# Patient Record
Sex: MALE | Race: WHITE | NOT HISPANIC OR LATINO | Employment: OTHER | URBAN - METROPOLITAN AREA
[De-identification: names, ages, dates, MRNs, and addresses within clinical notes are randomized per-mention and may not be internally consistent; named-entity substitution may affect disease eponyms.]

---

## 2023-01-04 LAB
NONINV COLON CA DNA+OCC BLD SCRN STL QL: NEGATIVE
NONINV COLON CA DNA+OCC BLD SCRN STL-IMP: NORMAL

## 2023-04-18 ENCOUNTER — TELEPHONE (OUTPATIENT)
Dept: PRIMARY CARE | Facility: CLINIC | Age: 68
End: 2023-04-18
Payer: MEDICARE

## 2023-04-18 DIAGNOSIS — G62.9 NEUROPATHY: Primary | ICD-10-CM

## 2023-04-18 RX ORDER — GABAPENTIN 300 MG/1
300 CAPSULE ORAL NIGHTLY
Qty: 30 CAPSULE | Refills: 0 | Status: SHIPPED | OUTPATIENT
Start: 2023-04-18 | End: 2023-04-28

## 2023-04-18 RX ORDER — GABAPENTIN 300 MG/1
300 CAPSULE ORAL NIGHTLY
COMMUNITY
Start: 2023-01-29 | End: 2023-04-18 | Stop reason: SDUPTHER

## 2023-04-18 NOTE — TELEPHONE ENCOUNTER
Can you refill his Gabapentin 300mg 1 capsule at bedtime-he is scheduled to see you April 28th [facility]

## 2023-04-21 PROBLEM — J93.9 PNEUMOTHORAX: Status: ACTIVE | Noted: 2023-04-21

## 2023-04-21 PROBLEM — S22.49XA RIB FRACTURES: Status: ACTIVE | Noted: 2023-04-21

## 2023-04-21 PROBLEM — B07.0 PLANTAR WART OF RIGHT FOOT: Status: ACTIVE | Noted: 2023-04-21

## 2023-04-21 PROBLEM — M54.9 BACK PAIN: Status: ACTIVE | Noted: 2023-04-21

## 2023-04-21 PROBLEM — E78.5 DYSLIPIDEMIA: Status: ACTIVE | Noted: 2023-04-21

## 2023-04-21 PROBLEM — U07.1 COVID-19: Status: RESOLVED | Noted: 2023-04-21 | Resolved: 2023-04-21

## 2023-04-21 PROBLEM — N28.9 RENAL INSUFFICIENCY: Status: ACTIVE | Noted: 2023-04-21

## 2023-04-21 PROBLEM — I10 BENIGN ESSENTIAL HYPERTENSION: Status: ACTIVE | Noted: 2023-04-21

## 2023-04-21 PROBLEM — R07.9 CHEST PAIN: Status: ACTIVE | Noted: 2023-04-21

## 2023-04-21 PROBLEM — R73.01 ELEVATED FASTING GLUCOSE: Status: ACTIVE | Noted: 2023-04-21

## 2023-04-21 PROBLEM — M79.643 HAND PAIN: Status: ACTIVE | Noted: 2023-04-21

## 2023-04-21 PROBLEM — I71.9 AORTIC ANEURYSM (CMS-HCC): Status: ACTIVE | Noted: 2023-04-21

## 2023-04-21 PROBLEM — R20.2 PARESTHESIA: Status: ACTIVE | Noted: 2023-04-21

## 2023-04-21 PROBLEM — M25.569 JOINT PAIN, KNEE: Status: ACTIVE | Noted: 2023-04-21

## 2023-04-21 RX ORDER — LISINOPRIL 20 MG/1
20 TABLET ORAL DAILY
COMMUNITY
End: 2023-04-28 | Stop reason: SDUPTHER

## 2023-04-28 ENCOUNTER — LAB (OUTPATIENT)
Dept: LAB | Facility: LAB | Age: 68
End: 2023-04-28
Payer: MEDICARE

## 2023-04-28 ENCOUNTER — OFFICE VISIT (OUTPATIENT)
Dept: PRIMARY CARE | Facility: CLINIC | Age: 68
End: 2023-04-28
Payer: MEDICARE

## 2023-04-28 VITALS
OXYGEN SATURATION: 95 % | DIASTOLIC BLOOD PRESSURE: 102 MMHG | HEART RATE: 77 BPM | HEIGHT: 67 IN | RESPIRATION RATE: 18 BRPM | SYSTOLIC BLOOD PRESSURE: 158 MMHG | WEIGHT: 181 LBS | BODY MASS INDEX: 28.41 KG/M2

## 2023-04-28 DIAGNOSIS — E55.9 VITAMIN D DEFICIENCY: ICD-10-CM

## 2023-04-28 DIAGNOSIS — E78.5 DYSLIPIDEMIA: ICD-10-CM

## 2023-04-28 DIAGNOSIS — R73.01 ELEVATED FASTING GLUCOSE: ICD-10-CM

## 2023-04-28 DIAGNOSIS — Z12.5 SCREENING PSA (PROSTATE SPECIFIC ANTIGEN): ICD-10-CM

## 2023-04-28 DIAGNOSIS — E53.8 VITAMIN B 12 DEFICIENCY: ICD-10-CM

## 2023-04-28 DIAGNOSIS — R20.0 NUMBNESS AND TINGLING IN BOTH HANDS: Primary | ICD-10-CM

## 2023-04-28 DIAGNOSIS — Z13.29 SCREENING FOR HYPOTHYROIDISM: ICD-10-CM

## 2023-04-28 DIAGNOSIS — R20.2 NUMBNESS AND TINGLING IN BOTH HANDS: Primary | ICD-10-CM

## 2023-04-28 DIAGNOSIS — I10 PRIMARY HYPERTENSION: ICD-10-CM

## 2023-04-28 PROBLEM — I71.9 AORTIC ANEURYSM (CMS-HCC): Status: RESOLVED | Noted: 2023-04-21 | Resolved: 2023-04-28

## 2023-04-28 LAB
ALANINE AMINOTRANSFERASE (SGPT) (U/L) IN SER/PLAS: 24 U/L (ref 10–52)
ALBUMIN (G/DL) IN SER/PLAS: 4.3 G/DL (ref 3.4–5)
ALKALINE PHOSPHATASE (U/L) IN SER/PLAS: 61 U/L (ref 33–136)
ANION GAP IN SER/PLAS: 13 MMOL/L (ref 10–20)
ASPARTATE AMINOTRANSFERASE (SGOT) (U/L) IN SER/PLAS: 18 U/L (ref 9–39)
BILIRUBIN TOTAL (MG/DL) IN SER/PLAS: 0.5 MG/DL (ref 0–1.2)
CALCIDIOL (25 OH VITAMIN D3) (NG/ML) IN SER/PLAS: 18 NG/ML
CALCIUM (MG/DL) IN SER/PLAS: 9.5 MG/DL (ref 8.6–10.3)
CARBON DIOXIDE, TOTAL (MMOL/L) IN SER/PLAS: 24 MMOL/L (ref 21–32)
CHLORIDE (MMOL/L) IN SER/PLAS: 106 MMOL/L (ref 98–107)
CHOLESTEROL (MG/DL) IN SER/PLAS: 198 MG/DL (ref 0–199)
CHOLESTEROL IN HDL (MG/DL) IN SER/PLAS: 50 MG/DL
CHOLESTEROL/HDL RATIO: 4
COBALAMIN (VITAMIN B12) (PG/ML) IN SER/PLAS: 276 PG/ML (ref 211–911)
CREATININE (MG/DL) IN SER/PLAS: 0.7 MG/DL (ref 0.5–1.3)
ERYTHROCYTE DISTRIBUTION WIDTH (RATIO) BY AUTOMATED COUNT: 13.8 % (ref 11.5–14.5)
ERYTHROCYTE MEAN CORPUSCULAR HEMOGLOBIN CONCENTRATION (G/DL) BY AUTOMATED: 32.2 G/DL (ref 32–36)
ERYTHROCYTE MEAN CORPUSCULAR VOLUME (FL) BY AUTOMATED COUNT: 87 FL (ref 80–100)
ERYTHROCYTES (10*6/UL) IN BLOOD BY AUTOMATED COUNT: 5.48 X10E12/L (ref 4.5–5.9)
ESTIMATED AVERAGE GLUCOSE FOR HBA1C: 131 MG/DL
GFR MALE: >90 ML/MIN/1.73M2
GLUCOSE (MG/DL) IN SER/PLAS: 98 MG/DL (ref 74–99)
HEMATOCRIT (%) IN BLOOD BY AUTOMATED COUNT: 47.8 % (ref 41–52)
HEMOGLOBIN (G/DL) IN BLOOD: 15.4 G/DL (ref 13.5–17.5)
HEMOGLOBIN A1C/HEMOGLOBIN TOTAL IN BLOOD: 6.2 %
LDL: 127 MG/DL (ref 0–99)
LEUKOCYTES (10*3/UL) IN BLOOD BY AUTOMATED COUNT: 5.7 X10E9/L (ref 4.4–11.3)
PLATELETS (10*3/UL) IN BLOOD AUTOMATED COUNT: 377 X10E9/L (ref 150–450)
POTASSIUM (MMOL/L) IN SER/PLAS: 4.7 MMOL/L (ref 3.5–5.3)
PROTEIN TOTAL: 6.6 G/DL (ref 6.4–8.2)
SODIUM (MMOL/L) IN SER/PLAS: 138 MMOL/L (ref 136–145)
THYROTROPIN (MIU/L) IN SER/PLAS BY DETECTION LIMIT <= 0.05 MIU/L: 2.07 MIU/L (ref 0.44–3.98)
TRIGLYCERIDE (MG/DL) IN SER/PLAS: 107 MG/DL (ref 0–149)
UREA NITROGEN (MG/DL) IN SER/PLAS: 19 MG/DL (ref 6–23)
VLDL: 21 MG/DL (ref 0–40)

## 2023-04-28 PROCEDURE — G0103 PSA SCREENING: HCPCS

## 2023-04-28 PROCEDURE — 85027 COMPLETE CBC AUTOMATED: CPT

## 2023-04-28 PROCEDURE — 1160F RVW MEDS BY RX/DR IN RCRD: CPT

## 2023-04-28 PROCEDURE — 3077F SYST BP >= 140 MM HG: CPT

## 2023-04-28 PROCEDURE — 1036F TOBACCO NON-USER: CPT

## 2023-04-28 PROCEDURE — 1159F MED LIST DOCD IN RCRD: CPT

## 2023-04-28 PROCEDURE — 83036 HEMOGLOBIN GLYCOSYLATED A1C: CPT

## 2023-04-28 PROCEDURE — 84443 ASSAY THYROID STIM HORMONE: CPT

## 2023-04-28 PROCEDURE — 82607 VITAMIN B-12: CPT

## 2023-04-28 PROCEDURE — 84154 ASSAY OF PSA FREE: CPT

## 2023-04-28 PROCEDURE — 80061 LIPID PANEL: CPT

## 2023-04-28 PROCEDURE — 99214 OFFICE O/P EST MOD 30 MIN: CPT

## 2023-04-28 PROCEDURE — 3080F DIAST BP >= 90 MM HG: CPT

## 2023-04-28 PROCEDURE — 82306 VITAMIN D 25 HYDROXY: CPT

## 2023-04-28 PROCEDURE — 36415 COLL VENOUS BLD VENIPUNCTURE: CPT

## 2023-04-28 PROCEDURE — 80053 COMPREHEN METABOLIC PANEL: CPT

## 2023-04-28 RX ORDER — GABAPENTIN 600 MG/1
600 TABLET ORAL NIGHTLY
Qty: 90 TABLET | Refills: 1 | Status: SHIPPED | OUTPATIENT
Start: 2023-04-28 | End: 2023-08-22 | Stop reason: SDUPTHER

## 2023-04-28 RX ORDER — LISINOPRIL 20 MG/1
20 TABLET ORAL 2 TIMES DAILY
Qty: 180 TABLET | Refills: 1 | Status: SHIPPED | OUTPATIENT
Start: 2023-04-28 | End: 2023-12-04 | Stop reason: SDUPTHER

## 2023-04-28 RX ORDER — MULTIVITAMIN
1 TABLET ORAL DAILY
COMMUNITY

## 2023-04-28 ASSESSMENT — ENCOUNTER SYMPTOMS
CONSTITUTIONAL NEGATIVE: 1
CARDIOVASCULAR NEGATIVE: 1
RESPIRATORY NEGATIVE: 1
ALLERGIC/IMMUNOLOGIC NEGATIVE: 1
PSYCHIATRIC NEGATIVE: 1
ENDOCRINE NEGATIVE: 1
GASTROINTESTINAL NEGATIVE: 1
HEMATOLOGIC/LYMPHATIC NEGATIVE: 1
EYES NEGATIVE: 1
MUSCULOSKELETAL NEGATIVE: 1
NEUROLOGICAL NEGATIVE: 1

## 2023-04-28 ASSESSMENT — PATIENT HEALTH QUESTIONNAIRE - PHQ9
1. LITTLE INTEREST OR PLEASURE IN DOING THINGS: NOT AT ALL
SUM OF ALL RESPONSES TO PHQ9 QUESTIONS 1 AND 2: 0
2. FEELING DOWN, DEPRESSED OR HOPELESS: NOT AT ALL

## 2023-04-28 NOTE — PROGRESS NOTES
Patient here today bacause his hands are tingly all the time and when he lays down at night to go to sleep they burn very bad.    2 years ago he fell on side of trailer( he is a ) and since then he has had the tingling and the burning at night.     Takes bp med at  night - should he be taking in morning ???

## 2023-04-28 NOTE — PATIENT INSTRUCTIONS
November 2023 appointment.    Complete bloodwork today    Gabapentin 600mg nightly    Lisinopril 20mg twice a day    EMG study ordered. Please call to schedule

## 2023-04-28 NOTE — PROGRESS NOTES
"Subjective   Patient ID: Alen Cobian is a 67 y.o. male who presents for Follow-up.    HPI a 67-year-old male with past medical history of hypertension, vitamin deficiency, numbness and tingling of the upper extremities, prediabetes arrives to the clinic with chief complaint of 4-month follow-up.  He is here to establish with a new primary care provider as his previous one is now retired.  At his last appointment, he was given gabapentin 300 mg oral tablet nightly for his numbness and tingling of his hands.  He reports that the gabapentin has worked however was still have episodes of numbness and tingling.He states that his son is a physical therapist who recommends a nerve conduction study.  He is wondering if he can get that ordered.  Other than this, the patient denies any chest pain, shortness of breath, diarrhea, fevers, chills, head pain, COVID-like symptoms.    Review of Systems   Constitutional: Negative.    HENT: Negative.     Eyes: Negative.    Respiratory: Negative.     Cardiovascular: Negative.    Gastrointestinal: Negative.    Endocrine: Negative.    Genitourinary: Negative.    Musculoskeletal: Negative.    Skin: Negative.    Allergic/Immunologic: Negative.    Neurological: Negative.    Hematological: Negative.    Psychiatric/Behavioral: Negative.     All other systems reviewed and are negative.      Objective   BP (!) 158/102 (BP Location: Right arm, BP Cuff Size: Large adult)   Pulse 77   Resp 18   Ht 1.702 m (5' 7\")   Wt 82.1 kg (181 lb)   SpO2 95%   BMI 28.35 kg/m²     Physical Exam  Vitals and nursing note reviewed.   Constitutional:       Appearance: Normal appearance.   HENT:      Head: Normocephalic and atraumatic.      Right Ear: Tympanic membrane normal.      Left Ear: Tympanic membrane normal.      Nose: Nose normal.      Mouth/Throat:      Mouth: Mucous membranes are moist.      Pharynx: Oropharynx is clear.   Eyes:      Extraocular Movements: Extraocular movements intact.      " Conjunctiva/sclera: Conjunctivae normal.      Pupils: Pupils are equal, round, and reactive to light.   Cardiovascular:      Rate and Rhythm: Normal rate and regular rhythm.   Pulmonary:      Effort: Pulmonary effort is normal.      Breath sounds: Normal breath sounds.   Abdominal:      General: Bowel sounds are normal.      Palpations: Abdomen is soft.   Musculoskeletal:         General: Normal range of motion.      Cervical back: Normal range of motion and neck supple.   Skin:     General: Skin is warm.      Capillary Refill: Capillary refill takes less than 2 seconds.   Neurological:      General: No focal deficit present.      Mental Status: He is alert and oriented to person, place, and time. Mental status is at baseline.   Psychiatric:         Mood and Affect: Mood normal.         Behavior: Behavior normal.         Thought Content: Thought content normal.         Judgment: Judgment normal.         Assessment/Plan   Problem List Items Addressed This Visit          Other    Dyslipidemia    Relevant Orders    Lipid Panel    Elevated fasting glucose    Relevant Orders    Comprehensive Metabolic Panel    Hemoglobin A1C     Other Visit Diagnoses       Numbness and tingling in both hands    -  Primary    Relevant Medications    gabapentin (Neurontin) 600 mg tablet    Other Relevant Orders    EMG & nerve conduction    Follow Up In [facility] - PCP    Vitamin D deficiency        Relevant Orders    Vitamin D, Total    Vitamin B 12 deficiency        Relevant Orders    CBC    Vitamin B12    Screening for hypothyroidism        Relevant Orders    TSH with reflex to Free T4 if abnormal    Screening PSA (prostate specific antigen)        Relevant Orders    PSA, Total and Free    Primary hypertension        Relevant Medications    lisinopril 20 mg tablet          It was a pleasure seeing you in the office today.    In regards to your numbness and tingling, I have increased your gabapentin 300 mg oral tablet nightly  to gabapentin 600 mg oral tablet nightly.  I have also ordered an EMG study for you to complete.  Please call the number in your discharge instructions to set this appointment up.    Your blood pressure in the office was also elevated.  Please begin taking lisinopril 20 mg oral tablet twice a day.  This has doubled since her last appointment.    I have ordered lab work for you to complete today.  Your next appointment will be in 6 months.  Please complete the lab work today.  I will call with any abnormal results.    I also advised you to follow low fat diet and exercise for at least 30 minutes daily.    Anticipatory guidance, age appropriate vaccines, screening exams, health promotion and prevention discussed.    This document was generated using the assistance of voice recognition software. If there are any errors of spelling, grammar, syntax, or meaning; please feel free to contact me directly for clarification.

## 2023-05-01 LAB
PROSTATE SPECIFIC AG (NG/ML) IN SER/PLAS: 1.8 NG/ML (ref 0–4)
PROSTATE SPECIFIC AG FREE (NG/ML) IN SER/PLAS: 0.4 NG/ML
PROSTATE SPECIFIC AG FREE/PROSTATE SPECIFIC AG TOTAL IN SER/PLAS: 22 %

## 2023-05-08 ENCOUNTER — TELEPHONE (OUTPATIENT)
Dept: PRIMARY CARE | Facility: CLINIC | Age: 68
End: 2023-05-08
Payer: MEDICARE

## 2023-05-08 DIAGNOSIS — I10 BENIGN ESSENTIAL HYPERTENSION: Primary | ICD-10-CM

## 2023-05-08 NOTE — TELEPHONE ENCOUNTER
Patient was in on 4/28 and increased taking lisinopril 20mg 2 times a day due to BP being high. Patient states his BP is still reading high and wanted to know if he should make an appointment or adjust meds. Thanks!

## 2023-05-09 RX ORDER — CHLORTHALIDONE 25 MG/1
25 TABLET ORAL DAILY
Qty: 90 TABLET | Refills: 0 | Status: SHIPPED | OUTPATIENT
Start: 2023-05-09 | End: 2023-08-22 | Stop reason: SDUPTHER

## 2023-05-09 NOTE — TELEPHONE ENCOUNTER
Please notify the patient to continue to take lisinopril 20mg oral tablet BID and I introduced chlorthalidone 25mg daily. Tell him to take this in the morning. Let him know to call us back in a month to let us know how his BP is doing thanks!    [facility] in [facility].

## 2023-08-21 ENCOUNTER — TELEPHONE (OUTPATIENT)
Dept: PRIMARY CARE | Facility: CLINIC | Age: 68
End: 2023-08-21
Payer: MEDICARE

## 2023-08-22 ENCOUNTER — TELEPHONE (OUTPATIENT)
Dept: PRIMARY CARE | Facility: CLINIC | Age: 68
End: 2023-08-22
Payer: MEDICARE

## 2023-08-22 DIAGNOSIS — R20.0 NUMBNESS AND TINGLING IN BOTH HANDS: ICD-10-CM

## 2023-08-22 DIAGNOSIS — I10 BENIGN ESSENTIAL HYPERTENSION: ICD-10-CM

## 2023-08-22 DIAGNOSIS — R20.2 NUMBNESS AND TINGLING IN BOTH HANDS: ICD-10-CM

## 2023-08-22 RX ORDER — CHLORTHALIDONE 25 MG/1
25 TABLET ORAL DAILY
Qty: 90 TABLET | Refills: 0 | Status: SHIPPED | OUTPATIENT
Start: 2023-08-22 | End: 2023-12-04 | Stop reason: SDUPTHER

## 2023-08-22 RX ORDER — GABAPENTIN 600 MG/1
600 TABLET ORAL NIGHTLY
Qty: 90 TABLET | Refills: 1 | Status: SHIPPED | OUTPATIENT
Start: 2023-08-22 | End: 2023-08-22 | Stop reason: SDUPTHER

## 2023-08-22 RX ORDER — GABAPENTIN 600 MG/1
600 TABLET ORAL 2 TIMES DAILY
Qty: 180 TABLET | Refills: 1 | Status: SHIPPED | OUTPATIENT
Start: 2023-08-22 | End: 2023-09-12 | Stop reason: SDUPTHER

## 2023-08-22 NOTE — TELEPHONE ENCOUNTER
Patient called for refill of gabapentin yesterday   State had bilateral carpal tunnel surgery 2 weeks ago at [facility]  Dr Paredes  Was told to take gabapentin 600mg 3 times per day   That why he needs refill

## 2023-09-12 ENCOUNTER — TELEPHONE (OUTPATIENT)
Dept: PRIMARY CARE | Facility: CLINIC | Age: 68
End: 2023-09-12
Payer: MEDICARE

## 2023-09-12 DIAGNOSIS — R20.2 NUMBNESS AND TINGLING IN BOTH HANDS: ICD-10-CM

## 2023-09-12 DIAGNOSIS — R20.0 NUMBNESS AND TINGLING IN BOTH HANDS: ICD-10-CM

## 2023-09-12 NOTE — TELEPHONE ENCOUNTER
Rx Refill Request Telephone Encounter    Name:  Alen Cobian  :  [id number]  Medication Name:  Gabapentin  600 mg          Specific Pharmacy location:  [facility]

## 2023-09-13 RX ORDER — GABAPENTIN 600 MG/1
600 TABLET ORAL 2 TIMES DAILY
Qty: 180 TABLET | Refills: 0 | Status: SHIPPED | OUTPATIENT
Start: 2023-09-13 | End: 2023-12-04 | Stop reason: WASHOUT

## 2023-11-10 ENCOUNTER — APPOINTMENT (OUTPATIENT)
Dept: PRIMARY CARE | Facility: CLINIC | Age: 68
End: 2023-11-10
Payer: MEDICARE

## 2023-11-22 ENCOUNTER — APPOINTMENT (OUTPATIENT)
Dept: PRIMARY CARE | Facility: CLINIC | Age: 68
End: 2023-11-22
Payer: MEDICARE

## 2023-12-04 ENCOUNTER — OFFICE VISIT (OUTPATIENT)
Dept: PRIMARY CARE | Facility: CLINIC | Age: 68
End: 2023-12-04
Payer: MEDICARE

## 2023-12-04 VITALS
DIASTOLIC BLOOD PRESSURE: 84 MMHG | WEIGHT: 174 LBS | BODY MASS INDEX: 27.31 KG/M2 | HEIGHT: 67 IN | HEART RATE: 87 BPM | SYSTOLIC BLOOD PRESSURE: 142 MMHG

## 2023-12-04 DIAGNOSIS — E53.8 VITAMIN B12 DEFICIENCY: ICD-10-CM

## 2023-12-04 DIAGNOSIS — Z00.00 MEDICARE ANNUAL WELLNESS VISIT, SUBSEQUENT: Primary | ICD-10-CM

## 2023-12-04 DIAGNOSIS — R20.2 NUMBNESS AND TINGLING IN BOTH HANDS: ICD-10-CM

## 2023-12-04 DIAGNOSIS — E78.5 DYSLIPIDEMIA: ICD-10-CM

## 2023-12-04 DIAGNOSIS — Z76.89 ENCOUNTER TO ESTABLISH CARE: ICD-10-CM

## 2023-12-04 DIAGNOSIS — I10 BENIGN ESSENTIAL HYPERTENSION: ICD-10-CM

## 2023-12-04 DIAGNOSIS — R73.03 PREDIABETES: ICD-10-CM

## 2023-12-04 DIAGNOSIS — R20.0 NUMBNESS AND TINGLING IN BOTH HANDS: ICD-10-CM

## 2023-12-04 DIAGNOSIS — E55.9 VITAMIN D DEFICIENCY: ICD-10-CM

## 2023-12-04 DIAGNOSIS — I10 PRIMARY HYPERTENSION: ICD-10-CM

## 2023-12-04 DIAGNOSIS — I77.819 AORTIC DILATATION (CMS-HCC): ICD-10-CM

## 2023-12-04 PROBLEM — R73.01 ELEVATED FASTING GLUCOSE: Status: RESOLVED | Noted: 2023-04-21 | Resolved: 2023-12-04

## 2023-12-04 PROCEDURE — 1160F RVW MEDS BY RX/DR IN RCRD: CPT | Performed by: CLINICAL NURSE SPECIALIST

## 2023-12-04 PROCEDURE — 3077F SYST BP >= 140 MM HG: CPT | Performed by: CLINICAL NURSE SPECIALIST

## 2023-12-04 PROCEDURE — 99214 OFFICE O/P EST MOD 30 MIN: CPT | Performed by: CLINICAL NURSE SPECIALIST

## 2023-12-04 PROCEDURE — 1036F TOBACCO NON-USER: CPT | Performed by: CLINICAL NURSE SPECIALIST

## 2023-12-04 PROCEDURE — 1159F MED LIST DOCD IN RCRD: CPT | Performed by: CLINICAL NURSE SPECIALIST

## 2023-12-04 PROCEDURE — 3079F DIAST BP 80-89 MM HG: CPT | Performed by: CLINICAL NURSE SPECIALIST

## 2023-12-04 PROCEDURE — 1170F FXNL STATUS ASSESSED: CPT | Performed by: CLINICAL NURSE SPECIALIST

## 2023-12-04 PROCEDURE — G0439 PPPS, SUBSEQ VISIT: HCPCS | Performed by: CLINICAL NURSE SPECIALIST

## 2023-12-04 PROCEDURE — G0444 DEPRESSION SCREEN ANNUAL: HCPCS | Performed by: CLINICAL NURSE SPECIALIST

## 2023-12-04 RX ORDER — CHLORTHALIDONE 25 MG/1
25 TABLET ORAL DAILY
Qty: 90 TABLET | Refills: 1 | Status: SHIPPED | OUTPATIENT
Start: 2023-12-04 | End: 2024-01-22 | Stop reason: SDUPTHER

## 2023-12-04 RX ORDER — GABAPENTIN 300 MG/1
300 CAPSULE ORAL 2 TIMES DAILY
COMMUNITY
End: 2024-01-22 | Stop reason: SDUPTHER

## 2023-12-04 RX ORDER — LISINOPRIL 20 MG/1
20 TABLET ORAL 2 TIMES DAILY
Qty: 180 TABLET | Refills: 1 | Status: SHIPPED | OUTPATIENT
Start: 2023-12-04 | End: 2024-06-04 | Stop reason: SDUPTHER

## 2023-12-04 RX ORDER — AMLODIPINE BESYLATE 2.5 MG/1
2.5 TABLET ORAL DAILY
Qty: 90 TABLET | Refills: 3 | Status: SHIPPED | OUTPATIENT
Start: 2023-12-04 | End: 2024-12-03

## 2023-12-04 ASSESSMENT — ENCOUNTER SYMPTOMS
NECK PAIN: 0
VOMITING: 0
COUGH: 0
UNEXPECTED WEIGHT CHANGE: 0
SORE THROAT: 0
DEPRESSION: 0
LOSS OF SENSATION IN FEET: 0
CHEST TIGHTNESS: 0
APPETITE CHANGE: 0
ACTIVITY CHANGE: 0
POLYDIPSIA: 0
DIZZINESS: 0
ABDOMINAL PAIN: 0
WOUND: 0
PHOTOPHOBIA: 0
SLEEP DISTURBANCE: 0
CHILLS: 0
FLANK PAIN: 0
FATIGUE: 0
HEMATURIA: 0
BLOOD IN STOOL: 0
TROUBLE SWALLOWING: 0
SHORTNESS OF BREATH: 0
OCCASIONAL FEELINGS OF UNSTEADINESS: 0
HEADACHES: 0
WHEEZING: 0
FEVER: 0
SEIZURES: 0
MYALGIAS: 0
DIARRHEA: 0
NUMBNESS: 1
JOINT SWELLING: 0
DYSURIA: 0
CONSTIPATION: 0
ARTHRALGIAS: 0
PALPITATIONS: 0
CONFUSION: 0
BACK PAIN: 0
EYE PAIN: 0
BRUISES/BLEEDS EASILY: 0
NAUSEA: 0

## 2023-12-04 ASSESSMENT — PATIENT HEALTH QUESTIONNAIRE - PHQ9
2. FEELING DOWN, DEPRESSED OR HOPELESS: NOT AT ALL
SUM OF ALL RESPONSES TO PHQ9 QUESTIONS 1 AND 2: 0
1. LITTLE INTEREST OR PLEASURE IN DOING THINGS: NOT AT ALL

## 2023-12-04 ASSESSMENT — ACTIVITIES OF DAILY LIVING (ADL)
GROCERY_SHOPPING: INDEPENDENT
DRESSING: INDEPENDENT
DOING_HOUSEWORK: INDEPENDENT
TAKING_MEDICATION: INDEPENDENT
BATHING: INDEPENDENT
MANAGING_FINANCES: INDEPENDENT

## 2023-12-04 NOTE — PROGRESS NOTES
Subjective   Reason for Visit: Alen Cobian is an 68 y.o. male here for a Medicare Wellness visit.     Past Medical, Surgical, and Family History reviewed and updated in chart.    Reviewed all medications by prescribing practitioner or clinical pharmacist (such as prescriptions, OTCs, herbal therapies and supplements) and documented in the medical record.    HPI    New patient here today to establish care.  Transfer care from .     Following with Cardiology, annually. Aortic dilation. Monitoring ECHO yearly.     Has been taking Gabapentin, using for the past few years. Feels that he has been having side effects of medication, feels dosage may be too high. Has been taking for Neuropathy. Carpal Tunnel Surgery earlier this year.     Patient Care Team:  AUSTYN Davidson-CNS as PCP - General (Internal Medicine)  Kolton Blanchard MD as Consulting Physician (Cardiology)     Review of Systems   Constitutional:  Negative for activity change, appetite change, chills, fatigue, fever and unexpected weight change.   HENT:  Negative for ear pain, hearing loss, nosebleeds, sore throat, tinnitus and trouble swallowing.    Eyes:  Negative for photophobia, pain and visual disturbance.   Respiratory:  Negative for cough, chest tightness, shortness of breath and wheezing.    Cardiovascular:  Negative for chest pain, palpitations and leg swelling.   Gastrointestinal:  Negative for abdominal pain, blood in stool, constipation, diarrhea, nausea and vomiting.   Endocrine: Negative for cold intolerance, heat intolerance, polydipsia and polyuria.   Genitourinary:  Negative for dysuria, flank pain and hematuria.   Musculoskeletal:  Negative for arthralgias, back pain, joint swelling, myalgias and neck pain.   Skin:  Negative for pallor, rash and wound.   Allergic/Immunologic: Negative for immunocompromised state.   Neurological:  Positive for numbness. Negative for dizziness, seizures and headaches.   Hematological:  Does not  "bruise/bleed easily.   Psychiatric/Behavioral:  Negative for confusion and sleep disturbance.        Objective   Vitals:  /84 (BP Location: Right arm, Patient Position: Sitting)   Pulse 87   Ht 1.702 m (5' 7\")   Wt 78.9 kg (174 lb)   BMI 27.25 kg/m²       Physical Exam  Vitals and nursing note reviewed.   Constitutional:       General: He is not in acute distress.     Appearance: Normal appearance.   HENT:      Head: Normocephalic.      Nose: Nose normal.   Eyes:      Conjunctiva/sclera: Conjunctivae normal.   Neck:      Vascular: No carotid bruit.   Cardiovascular:      Rate and Rhythm: Normal rate and regular rhythm.      Pulses: Normal pulses.      Heart sounds: Normal heart sounds.   Pulmonary:      Effort: Pulmonary effort is normal.      Breath sounds: Normal breath sounds.   Abdominal:      General: Bowel sounds are normal.      Palpations: Abdomen is soft.   Musculoskeletal:         General: Normal range of motion.      Cervical back: Normal range of motion.   Skin:     General: Skin is warm and dry.   Neurological:      Mental Status: He is alert and oriented to person, place, and time. Mental status is at baseline.   Psychiatric:         Mood and Affect: Mood normal.         Behavior: Behavior normal.       Assessment/Plan   Problem List Items Addressed This Visit       Benign essential hypertension     Other Visit Diagnoses       Numbness and tingling in both hands        Primary hypertension              Reviewed most recent lab work completed with patient.     Prediabetes, Dyslipidemia: A1C 6.2%. Cholesterol improved on most recent lab work. Lifestyle changes recommended: Diet consisting of low fat foods, lean meats, high fiber, fresh fruits and vegetables. 150 min/ weekly aerobic exercise. Following with Cardiology, annually.   Hypertension: Continue Lisinopril and Chlorthalidone. Add Amlodipine. Continue to follow with Cardiology.   Aortic Dilation: Monitoring annually with ECHO. "   Numbness/Tingling: Decrease dosage of Gabapentin. Continue to monitor.   Vitamin D Deficiency: Vitamin D. Reevaluate with next lab work.   Vitamin B12 Deficiency: Vitamin B12. Reevaluate with next lab work.    Medicare Wellness: Routine and age appropriate recommendations discussed with the patient today and patient verbalized understanding of the recommendations.  Questions answered.  Age appropriate immunizations and preventative screenings discussed with the patient and ordered as appropriate. Labs updated and ordered as indicated. Recommend healthy diet and daily exercise to maintain healthy body weight.     Medicare Wellness: December 2023.   Tdap: September 2017.   Prostate Screening: April 2023.   Cologuard: December 2022, negative.   Declined updated immunizations.

## 2023-12-14 ENCOUNTER — TELEPHONE (OUTPATIENT)
Dept: PRIMARY CARE | Facility: CLINIC | Age: 68
End: 2023-12-14
Payer: MEDICARE

## 2023-12-14 NOTE — TELEPHONE ENCOUNTER
----- Message from AUSTYN Davidson-CNS sent at 12/14/2023  1:54 PM EST -----  Please let patient know that Cologuard was negative. Thank you!

## 2024-01-22 ENCOUNTER — TELEPHONE (OUTPATIENT)
Dept: PRIMARY CARE | Facility: CLINIC | Age: 69
End: 2024-01-22
Payer: MEDICARE

## 2024-01-22 DIAGNOSIS — R20.2 NUMBNESS AND TINGLING IN BOTH HANDS: ICD-10-CM

## 2024-01-22 DIAGNOSIS — R20.0 NUMBNESS AND TINGLING IN BOTH HANDS: ICD-10-CM

## 2024-01-22 DIAGNOSIS — I10 BENIGN ESSENTIAL HYPERTENSION: ICD-10-CM

## 2024-01-22 RX ORDER — GABAPENTIN 300 MG/1
300 CAPSULE ORAL 2 TIMES DAILY
Qty: 60 CAPSULE | Refills: 0 | Status: SHIPPED | OUTPATIENT
Start: 2024-01-22 | End: 2024-02-29 | Stop reason: SDUPTHER

## 2024-01-22 RX ORDER — CHLORTHALIDONE 25 MG/1
25 TABLET ORAL DAILY
Qty: 90 TABLET | Refills: 1 | Status: SHIPPED | OUTPATIENT
Start: 2024-01-22 | End: 2024-06-04 | Stop reason: SDUPTHER

## 2024-01-22 NOTE — TELEPHONE ENCOUNTER
Needs a refill on his Chlorthalidone 25 mg takes it 1 time a day & Gabapentin 300 mg takes it 2 times a day please send to [place] [facility]

## 2024-02-28 ENCOUNTER — TELEPHONE (OUTPATIENT)
Dept: PRIMARY CARE | Facility: CLINIC | Age: 69
End: 2024-02-28
Payer: MEDICARE

## 2024-02-28 DIAGNOSIS — R20.2 NUMBNESS AND TINGLING IN BOTH HANDS: ICD-10-CM

## 2024-02-28 DIAGNOSIS — R20.0 NUMBNESS AND TINGLING IN BOTH HANDS: ICD-10-CM

## 2024-02-28 NOTE — TELEPHONE ENCOUNTER
Rx Refill Request Telephone Encounter    Name:  Alen Cobian  :  [id number]  Medication Name:  Gabapentin  300 mg        Take 1 capsule by mouth 2 times a day  Specific Pharmacy location:  [facility]

## 2024-02-29 RX ORDER — GABAPENTIN 300 MG/1
300 CAPSULE ORAL 2 TIMES DAILY
Qty: 60 CAPSULE | Refills: 2 | Status: SHIPPED | OUTPATIENT
Start: 2024-02-29 | End: 2024-06-04 | Stop reason: SDUPTHER

## 2024-06-04 ENCOUNTER — LAB (OUTPATIENT)
Dept: LAB | Facility: LAB | Age: 69
End: 2024-06-04
Payer: MEDICARE

## 2024-06-04 ENCOUNTER — OFFICE VISIT (OUTPATIENT)
Dept: PRIMARY CARE | Facility: CLINIC | Age: 69
End: 2024-06-04
Payer: MEDICARE

## 2024-06-04 VITALS
HEIGHT: 67 IN | DIASTOLIC BLOOD PRESSURE: 80 MMHG | BODY MASS INDEX: 27 KG/M2 | WEIGHT: 172 LBS | SYSTOLIC BLOOD PRESSURE: 128 MMHG | HEART RATE: 79 BPM | OXYGEN SATURATION: 95 %

## 2024-06-04 DIAGNOSIS — Z12.5 PROSTATE CANCER SCREENING: Primary | ICD-10-CM

## 2024-06-04 DIAGNOSIS — Z00.00 MEDICARE ANNUAL WELLNESS VISIT, SUBSEQUENT: ICD-10-CM

## 2024-06-04 DIAGNOSIS — R20.2 NUMBNESS AND TINGLING IN BOTH HANDS: ICD-10-CM

## 2024-06-04 DIAGNOSIS — E53.8 VITAMIN B12 DEFICIENCY: ICD-10-CM

## 2024-06-04 DIAGNOSIS — E78.5 DYSLIPIDEMIA: ICD-10-CM

## 2024-06-04 DIAGNOSIS — R20.0 NUMBNESS AND TINGLING IN BOTH HANDS: ICD-10-CM

## 2024-06-04 DIAGNOSIS — I77.819 AORTIC DILATATION (CMS-HCC): ICD-10-CM

## 2024-06-04 DIAGNOSIS — I10 BENIGN ESSENTIAL HYPERTENSION: ICD-10-CM

## 2024-06-04 DIAGNOSIS — R73.03 PREDIABETES: ICD-10-CM

## 2024-06-04 DIAGNOSIS — Z12.5 PROSTATE CANCER SCREENING: ICD-10-CM

## 2024-06-04 DIAGNOSIS — I10 PRIMARY HYPERTENSION: ICD-10-CM

## 2024-06-04 DIAGNOSIS — E55.9 VITAMIN D DEFICIENCY: ICD-10-CM

## 2024-06-04 DIAGNOSIS — M25.511 RIGHT SHOULDER PAIN, UNSPECIFIED CHRONICITY: ICD-10-CM

## 2024-06-04 DIAGNOSIS — R19.7 DIARRHEA, UNSPECIFIED TYPE: ICD-10-CM

## 2024-06-04 LAB
25(OH)D3 SERPL-MCNC: 26 NG/ML (ref 30–100)
ALBUMIN SERPL BCP-MCNC: 4.4 G/DL (ref 3.4–5)
ALP SERPL-CCNC: 62 U/L (ref 33–136)
ALT SERPL W P-5'-P-CCNC: 20 U/L (ref 10–52)
ANION GAP SERPL CALC-SCNC: 13 MMOL/L (ref 10–20)
AST SERPL W P-5'-P-CCNC: 15 U/L (ref 9–39)
BILIRUB SERPL-MCNC: 0.4 MG/DL (ref 0–1.2)
BUN SERPL-MCNC: 23 MG/DL (ref 6–23)
CALCIUM SERPL-MCNC: 9.8 MG/DL (ref 8.6–10.3)
CHLORIDE SERPL-SCNC: 103 MMOL/L (ref 98–107)
CHOLEST SERPL-MCNC: 181 MG/DL (ref 0–199)
CHOLESTEROL/HDL RATIO: 4
CO2 SERPL-SCNC: 26 MMOL/L (ref 21–32)
CREAT SERPL-MCNC: 0.93 MG/DL (ref 0.5–1.3)
EGFRCR SERPLBLD CKD-EPI 2021: 88 ML/MIN/1.73M*2
ERYTHROCYTE [DISTWIDTH] IN BLOOD BY AUTOMATED COUNT: 13.8 % (ref 11.5–14.5)
EST. AVERAGE GLUCOSE BLD GHB EST-MCNC: 131 MG/DL
GLUCOSE SERPL-MCNC: 103 MG/DL (ref 74–99)
HBA1C MFR BLD: 6.2 %
HCT VFR BLD AUTO: 46.2 % (ref 41–52)
HDLC SERPL-MCNC: 45.3 MG/DL
HGB BLD-MCNC: 15.5 G/DL (ref 13.5–17.5)
LDLC SERPL CALC-MCNC: 112 MG/DL
MCH RBC QN AUTO: 28.5 PG (ref 26–34)
MCHC RBC AUTO-ENTMCNC: 33.5 G/DL (ref 32–36)
MCV RBC AUTO: 85 FL (ref 80–100)
NON HDL CHOLESTEROL: 136 MG/DL (ref 0–149)
NRBC BLD-RTO: 0 /100 WBCS (ref 0–0)
PLATELET # BLD AUTO: 411 X10*3/UL (ref 150–450)
POTASSIUM SERPL-SCNC: 4.4 MMOL/L (ref 3.5–5.3)
PROT SERPL-MCNC: 7.1 G/DL (ref 6.4–8.2)
PSA SERPL-MCNC: 1.2 NG/ML
RBC # BLD AUTO: 5.43 X10*6/UL (ref 4.5–5.9)
SODIUM SERPL-SCNC: 138 MMOL/L (ref 136–145)
TRIGL SERPL-MCNC: 117 MG/DL (ref 0–149)
TSH SERPL-ACNC: 2.38 MIU/L (ref 0.44–3.98)
VIT B12 SERPL-MCNC: 205 PG/ML (ref 211–911)
VLDL: 23 MG/DL (ref 0–40)
WBC # BLD AUTO: 6.6 X10*3/UL (ref 4.4–11.3)

## 2024-06-04 PROCEDURE — 83036 HEMOGLOBIN GLYCOSYLATED A1C: CPT

## 2024-06-04 PROCEDURE — 1123F ACP DISCUSS/DSCN MKR DOCD: CPT | Performed by: CLINICAL NURSE SPECIALIST

## 2024-06-04 PROCEDURE — G0103 PSA SCREENING: HCPCS

## 2024-06-04 PROCEDURE — 80053 COMPREHEN METABOLIC PANEL: CPT

## 2024-06-04 PROCEDURE — 99214 OFFICE O/P EST MOD 30 MIN: CPT | Performed by: CLINICAL NURSE SPECIALIST

## 2024-06-04 PROCEDURE — 85027 COMPLETE CBC AUTOMATED: CPT

## 2024-06-04 PROCEDURE — 82607 VITAMIN B-12: CPT

## 2024-06-04 PROCEDURE — 1036F TOBACCO NON-USER: CPT | Performed by: CLINICAL NURSE SPECIALIST

## 2024-06-04 PROCEDURE — 3079F DIAST BP 80-89 MM HG: CPT | Performed by: CLINICAL NURSE SPECIALIST

## 2024-06-04 PROCEDURE — 3074F SYST BP LT 130 MM HG: CPT | Performed by: CLINICAL NURSE SPECIALIST

## 2024-06-04 PROCEDURE — 82306 VITAMIN D 25 HYDROXY: CPT

## 2024-06-04 PROCEDURE — 1159F MED LIST DOCD IN RCRD: CPT | Performed by: CLINICAL NURSE SPECIALIST

## 2024-06-04 PROCEDURE — 84443 ASSAY THYROID STIM HORMONE: CPT

## 2024-06-04 PROCEDURE — 1160F RVW MEDS BY RX/DR IN RCRD: CPT | Performed by: CLINICAL NURSE SPECIALIST

## 2024-06-04 PROCEDURE — 80061 LIPID PANEL: CPT

## 2024-06-04 PROCEDURE — 36415 COLL VENOUS BLD VENIPUNCTURE: CPT

## 2024-06-04 RX ORDER — PREDNISONE 20 MG/1
TABLET ORAL DAILY
Qty: 18 TABLET | Refills: 0 | Status: SHIPPED | OUTPATIENT
Start: 2024-06-04 | End: 2024-06-13

## 2024-06-04 RX ORDER — GABAPENTIN 300 MG/1
300 CAPSULE ORAL 2 TIMES DAILY
Qty: 180 CAPSULE | Refills: 3 | Status: SHIPPED | OUTPATIENT
Start: 2024-06-04 | End: 2025-05-30

## 2024-06-04 RX ORDER — LISINOPRIL 20 MG/1
20 TABLET ORAL 2 TIMES DAILY
Qty: 180 TABLET | Refills: 3 | Status: SHIPPED | OUTPATIENT
Start: 2024-06-04 | End: 2025-05-30

## 2024-06-04 RX ORDER — CHLORTHALIDONE 25 MG/1
25 TABLET ORAL DAILY
Qty: 90 TABLET | Refills: 3 | Status: SHIPPED | OUTPATIENT
Start: 2024-06-04 | End: 2025-05-30

## 2024-06-04 ASSESSMENT — ENCOUNTER SYMPTOMS
JOINT SWELLING: 0
FEVER: 0
DIZZINESS: 0
CHEST TIGHTNESS: 0
DIARRHEA: 0
CONFUSION: 0
COUGH: 0
HEMATURIA: 0
FATIGUE: 0
POLYDIPSIA: 0
MYALGIAS: 0
SHORTNESS OF BREATH: 0
ARTHRALGIAS: 0
SLEEP DISTURBANCE: 0
PALPITATIONS: 0
NAUSEA: 0
CONSTIPATION: 0
LOSS OF SENSATION IN FEET: 0
DYSURIA: 0
APPETITE CHANGE: 0
TROUBLE SWALLOWING: 0
ACTIVITY CHANGE: 0
WHEEZING: 0
ABDOMINAL PAIN: 0
DEPRESSION: 0
BACK PAIN: 0
WOUND: 0
CHILLS: 0
HEADACHES: 0
BRUISES/BLEEDS EASILY: 0
PHOTOPHOBIA: 0
OCCASIONAL FEELINGS OF UNSTEADINESS: 0
SEIZURES: 0
UNEXPECTED WEIGHT CHANGE: 0
BLOOD IN STOOL: 0
VOMITING: 0
SORE THROAT: 0
EYE PAIN: 0
NECK PAIN: 0
FLANK PAIN: 0

## 2024-06-04 ASSESSMENT — COLUMBIA-SUICIDE SEVERITY RATING SCALE - C-SSRS
2. HAVE YOU ACTUALLY HAD ANY THOUGHTS OF KILLING YOURSELF?: NO
6. HAVE YOU EVER DONE ANYTHING, STARTED TO DO ANYTHING, OR PREPARED TO DO ANYTHING TO END YOUR LIFE?: NO
1. IN THE PAST MONTH, HAVE YOU WISHED YOU WERE DEAD OR WISHED YOU COULD GO TO SLEEP AND NOT WAKE UP?: NO

## 2024-06-04 NOTE — PROGRESS NOTES
Subjective   Patient ID: Alen Cobian is a 68 y.o. male who presents for Follow-up (Pt here for follow up, sciatic nerve pain going down R leg and feels like its falling asleep, R shoulder aggravation.).  HPI    Here today as a follow up appointment.      Following with Cardiology, annually. Aortic dilation. Monitoring ECHO yearly. Scheduled for the end of this month.      Has been taking Gabapentin, using for the past few years. Has been taking for Neuropathy. Carpal Tunnel Surgery August 2023.    Patient is having increased pain on the right Shoulder. Pain has been persistent. Patient recently washed his Semi by hand.     Patient states that he has coffee in the AM, a few beef sticks. Will trigger to have a bowel movement, loose. 2-3x in the AM and then will not have any other symptoms. First thing in the AM is formed and then within about 1-2H will have a loose bowel movement. Denies any blood or mucus in the bowels.      Review of Systems   Constitutional:  Negative for activity change, appetite change, chills, fatigue, fever and unexpected weight change.   HENT:  Negative for ear pain, hearing loss, nosebleeds, sore throat, tinnitus and trouble swallowing.    Eyes:  Negative for photophobia, pain and visual disturbance.   Respiratory:  Negative for cough, chest tightness, shortness of breath and wheezing.    Cardiovascular:  Negative for chest pain, palpitations and leg swelling.   Gastrointestinal:  Negative for abdominal pain, blood in stool, constipation, diarrhea, nausea and vomiting.   Endocrine: Negative for cold intolerance, heat intolerance, polydipsia and polyuria.   Genitourinary:  Negative for dysuria, flank pain and hematuria.   Musculoskeletal:  Negative for arthralgias, back pain, joint swelling, myalgias and neck pain.   Skin:  Negative for pallor, rash and wound.   Allergic/Immunologic: Negative for immunocompromised state.   Neurological:  Negative for dizziness, seizures and headaches.    Hematological:  Does not bruise/bleed easily.   Psychiatric/Behavioral:  Negative for confusion and sleep disturbance.        Objective   Physical Exam  Vitals and nursing note reviewed.   Constitutional:       General: He is not in acute distress.     Appearance: Normal appearance.   HENT:      Head: Normocephalic.      Nose: Nose normal.   Eyes:      Conjunctiva/sclera: Conjunctivae normal.   Neck:      Vascular: No carotid bruit.   Cardiovascular:      Rate and Rhythm: Normal rate and regular rhythm.      Pulses: Normal pulses.      Heart sounds: Normal heart sounds.   Pulmonary:      Effort: Pulmonary effort is normal.      Breath sounds: Normal breath sounds.   Abdominal:      General: Bowel sounds are normal.      Palpations: Abdomen is soft.   Musculoskeletal:         General: Normal range of motion.      Cervical back: Normal range of motion.   Skin:     General: Skin is warm and dry.   Neurological:      Mental Status: He is alert and oriented to person, place, and time. Mental status is at baseline.   Psychiatric:         Mood and Affect: Mood normal.         Behavior: Behavior normal.       Assessment/Plan        Reviewed most recent lab work completed with patient.      Prediabetes, Dyslipidemia: A1C 6.2%. Cholesterol improved on most recent lab work. Lifestyle changes recommended: Diet consisting of low fat foods, lean meats, high fiber, fresh fruits and vegetables. 150 min/ weekly aerobic exercise. Following with Cardiology, annually.   Hypertension: Continue Lisinopril, Amlodipine, and Chlorthalidone. Continue to follow with Cardiology.   Aortic Dilation: Monitoring annually with ECHO. Follow up scheduled at the end of this month.   Numbness/Tingling: Continue Gabapentin. Continue to monitor.   Vitamin D Deficiency: Vitamin D. Reevaluate with next lab work.   Vitamin B12 Deficiency: Vitamin B12. Reevaluate with next lab work.   Right Shoulder Pain: Will treat acute Flare with Prednisone. Follow up  with medication effectiveness.   Diarrhea: Discussed intake. Cologuard in 2022. If no improvement will plan Colonoscopy, declined having done at this time.      Medicare Wellness: December 2023.   Tdap: September 2017.   Prostate Screening: April 2023.  Ordered for 2024.   Cologuard: December 2022, negative.   Declined updated immunizations.     Minal Varghese, AUSTYN-CNS 06/04/24 7:26 AM

## 2024-06-06 ENCOUNTER — TELEPHONE (OUTPATIENT)
Dept: PRIMARY CARE | Facility: CLINIC | Age: 69
End: 2024-06-06
Payer: MEDICARE

## 2024-06-06 DIAGNOSIS — I10 PRIMARY HYPERTENSION: ICD-10-CM

## 2024-06-06 DIAGNOSIS — E78.5 DYSLIPIDEMIA: ICD-10-CM

## 2024-06-06 DIAGNOSIS — E55.9 VITAMIN D DEFICIENCY: ICD-10-CM

## 2024-06-06 DIAGNOSIS — E53.8 VITAMIN B12 DEFICIENCY: ICD-10-CM

## 2024-06-06 DIAGNOSIS — R73.03 PREDIABETES: ICD-10-CM

## 2024-06-06 NOTE — TELEPHONE ENCOUNTER
----- Message from AUSTYN Davidson-CNS sent at 6/5/2024  9:38 PM EDT -----  Please call patient with lab results. A1C is unchanged, 6.2%. Still Prediabetes. Vitamin is improved but still low. Start/increase Vitamin D OTC. B12 has decreased. Start/increase Vitamin B12 OTC. Remaining blood work is stable. Plan to repeat Vitamin D, B12, A1C and CMP prior to follow up appointment. Please order lab work. Thank you!

## 2024-06-28 ENCOUNTER — OFFICE VISIT (OUTPATIENT)
Dept: CARDIOLOGY | Facility: HOSPITAL | Age: 69
End: 2024-06-28
Payer: MEDICARE

## 2024-06-28 VITALS
SYSTOLIC BLOOD PRESSURE: 120 MMHG | DIASTOLIC BLOOD PRESSURE: 77 MMHG | OXYGEN SATURATION: 97 % | BODY MASS INDEX: 27.38 KG/M2 | HEART RATE: 77 BPM | WEIGHT: 174.82 LBS

## 2024-06-28 DIAGNOSIS — I10 BENIGN ESSENTIAL HYPERTENSION: Primary | ICD-10-CM

## 2024-06-28 LAB
ATRIAL RATE: 71 BPM
P AXIS: 42 DEGREES
P OFFSET: 195 MS
P ONSET: 140 MS
PR INTERVAL: 160 MS
Q ONSET: 220 MS
QRS COUNT: 12 BEATS
QRS DURATION: 110 MS
QT INTERVAL: 384 MS
QTC CALCULATION(BAZETT): 417 MS
QTC FREDERICIA: 406 MS
R AXIS: 48 DEGREES
T AXIS: 33 DEGREES
T OFFSET: 412 MS
VENTRICULAR RATE: 71 BPM

## 2024-06-28 PROCEDURE — 3074F SYST BP LT 130 MM HG: CPT | Performed by: NURSE PRACTITIONER

## 2024-06-28 PROCEDURE — 1159F MED LIST DOCD IN RCRD: CPT | Performed by: NURSE PRACTITIONER

## 2024-06-28 PROCEDURE — 99213 OFFICE O/P EST LOW 20 MIN: CPT | Performed by: NURSE PRACTITIONER

## 2024-06-28 PROCEDURE — 93005 ELECTROCARDIOGRAM TRACING: CPT | Performed by: NURSE PRACTITIONER

## 2024-06-28 PROCEDURE — 1036F TOBACCO NON-USER: CPT | Performed by: NURSE PRACTITIONER

## 2024-06-28 PROCEDURE — 3078F DIAST BP <80 MM HG: CPT | Performed by: NURSE PRACTITIONER

## 2024-06-28 PROCEDURE — 1123F ACP DISCUSS/DSCN MKR DOCD: CPT | Performed by: NURSE PRACTITIONER

## 2024-06-28 ASSESSMENT — ENCOUNTER SYMPTOMS
MYALGIAS: 1
PSYCHIATRIC NEGATIVE: 1
CARDIOVASCULAR NEGATIVE: 1
GASTROINTESTINAL NEGATIVE: 1
NEUROLOGICAL NEGATIVE: 1
ENDOCRINE NEGATIVE: 1
RESPIRATORY NEGATIVE: 1
EYES NEGATIVE: 1
HEMATOLOGIC/LYMPHATIC NEGATIVE: 1
CONSTITUTIONAL NEGATIVE: 1

## 2024-06-28 NOTE — PROGRESS NOTES
Referred by Dr. Mathur ref. provider found for Follow-up (1 yr.)     History Of Present Illness:    Alen Cobian is a very pleasant 68 year old gentleman with a history of HTN and aortic dilatation, he is here for a follow up visit. The patient is seen in collaboration with Dr. Blanchard. Denies chest pain, shortness of breath or heart palpitations. Had Carpal tunnel surgery on both hands   Continues to stay active.    Review of Systems   Constitutional: Negative.   HENT: Negative.     Eyes: Negative.    Cardiovascular: Negative.    Respiratory: Negative.     Endocrine: Negative.    Hematologic/Lymphatic: Negative.    Skin: Negative.    Musculoskeletal:  Positive for myalgias.   Gastrointestinal: Negative.    Neurological: Negative.    Psychiatric/Behavioral: Negative.          Past Medical History:  He has no past medical history on file.    Past Surgical History:  He has no past surgical history on file.      Social History:  He reports that he quit smoking about 54 years ago. His smoking use included cigarettes. He started smoking about 55 years ago. He has a 0.5 pack-year smoking history. He has never used smokeless tobacco. He reports that he does not drink alcohol and does not use drugs.    Family History:  No family history on file.     Allergies:  Patient has no known allergies.    Outpatient Medications:  Current Outpatient Medications   Medication Instructions    amLODIPine (NORVASC) 2.5 mg, oral, Daily    chlorthalidone (HYGROTON) 25 mg, oral, Daily    gabapentin (NEURONTIN) 300 mg, oral, 2 times daily    lisinopril 20 mg, oral, 2 times daily, as directed        Last Recorded Vitals:  Vitals:    06/28/24 1101   BP: 120/77   Pulse: 77   SpO2: 97%   Weight: 79.3 kg (174 lb 13.2 oz)       Physical Exam:  Physical Exam  Vitals reviewed.   HENT:      Head: Normocephalic.      Nose: Nose normal.   Eyes:      Pupils: Pupils are equal, round, and reactive to light.   Cardiovascular:      Rate and Rhythm: Normal rate  and regular rhythm.   Pulmonary:      Effort: Pulmonary effort is normal.      Breath sounds: Normal breath sounds.   Abdominal:      General: Abdomen is flat.      Palpations: Abdomen is soft.   Musculoskeletal:         General: Normal range of motion.      Cervical back: Normal range of motion.   Skin:     General: Skin is warm and dry.   Neurological:      General: No focal deficit present.      Mental Status: He is alert and oriented to person, place, and time.   Psychiatric:         Mood and Affect: Mood normal.            Last Labs:  CBC -  Lab Results   Component Value Date    WBC 6.6 06/04/2024    HGB 15.5 06/04/2024    HCT 46.2 06/04/2024    MCV 85 06/04/2024     06/04/2024       CMP -  Lab Results   Component Value Date    CALCIUM 9.8 06/04/2024    PROT 7.1 06/04/2024    ALBUMIN 4.4 06/04/2024    AST 15 06/04/2024    ALT 20 06/04/2024    ALKPHOS 62 06/04/2024    BILITOT 0.4 06/04/2024       LIPID PANEL -   Lab Results   Component Value Date    CHOL 181 06/04/2024    TRIG 117 06/04/2024    HDL 45.3 06/04/2024    CHHDL 4.0 06/04/2024    LDLF 127 (H) 04/28/2023    VLDL 23 06/04/2024    NHDL 136 06/04/2024       RENAL FUNCTION PANEL -   Lab Results   Component Value Date    GLUCOSE 103 (H) 06/04/2024     06/04/2024    K 4.4 06/04/2024     06/04/2024    CO2 26 06/04/2024    ANIONGAP 13 06/04/2024    BUN 23 06/04/2024    CREATININE 0.93 06/04/2024    GFRMALE >90 04/28/2023    CALCIUM 9.8 06/04/2024    ALBUMIN 4.4 06/04/2024        Lab Results   Component Value Date    HGBA1C 6.2 (H) 06/04/2024       Last Cardiology Tests:  ECG:  EKG independently reviewed from today showed sinus rhythm heart rate 71 bpm   Echo:  Echocardiogram 6/30/2023  1. Left ventricular systolic function is normal with a 55-60% estimated ejection fraction.  2. There is concentric left ventricular hypertrophy.  3. There is mild dilatation of the aortic root (4.1cm).  4. There is trace mitral, tricuspid and pulmonic  regurgitation.     Echocardiogram 10/9/2020   1. The left ventricular systolic function is normal with a 60-65% estimated  ejection fraction.  2. Spectral Doppler shows an impaired relaxation pattern of left ventricular  diastolic filling.  3. There is mild mitral and tricuspid regurgitation.  4. There is mild dilatation of the ascending aorta and aortic root (3.9-4.1cm).    Echo 10/6/2017:   1. The left ventricular systolic function is low normal with a 55% estimated  ejection fraction.   2. There is trace mitral and tricuspid regurgitation.   3. There is mild dilatation of the ascending aorta (4.1cm) and aortic root  (3.8cm).    Echo 8/9/2011:  There is normal left ventricular systolic function.  The estimated ejection fraction is 60-65%.   The right ventricular global systolic function is normal.  There is mild tricuspid regurgitation.  There is no pericardial effusion.  There is evidence of borderline pulmonary hypertension.      Ejection Fractions:  LVEF 55-60%  Cath:    Stress Test:  Stress test 10/6/2017:  1. Normal stress myocardial perfusion imaging.  2. Well-maintained left ventricular function.  Cardiac Imaging:  CAC score 2011:  FINDINGS:  The score and distribution of calcium in the coronary arteries is as   follows:      LAD  32, 11 sq mm      Total  32     The visualized mid/lower ascending thoracic aorta measures 4.2 cm in   diameter. The heart is within normal limits for size. No pericardial   effusion is identified. Mild atherosclerotic calcifications are seen   in the descending thoracic aorta.      Mild scarring and/or dependent atelectasis is seen in the lower lobes   bilaterally. No focal infiltrate, pleural effusion or pneumothorax is   seen. No definite discrete pulmonary nodules are seen bilaterally.    Assessment/Plan   Extremely pleasant 69 year-old gentleman with hypertension and mild aortic dilatation, doing well from CV standpoint. No complaints and remains very active. Repeat  echocardiogram shows a preserved LV function. Blood pressure and heart rate remains well controlled.      Plan  -Call with any questions  -Continue Lisinopril 20mg daily, Amlodipine and Chlorthalidone  -Follow up in 1 year or sooner if needed.        Kathrin Osborne, AUSTYN-CNP

## 2024-07-15 ENCOUNTER — TELEPHONE (OUTPATIENT)
Dept: PRIMARY CARE | Facility: CLINIC | Age: 69
End: 2024-07-15
Payer: MEDICARE

## 2024-07-15 NOTE — TELEPHONE ENCOUNTER
----- Message from Minal Varghese sent at 7/14/2024  1:49 PM EDT -----  Please call patient with results of his study. Severely abnormal. Would recommend referral to Orthopedics for recommendation of management. Thank you!

## 2024-08-07 ENCOUNTER — APPOINTMENT (OUTPATIENT)
Dept: RADIOLOGY | Facility: CLINIC | Age: 69
End: 2024-08-07
Payer: MEDICARE

## 2024-08-07 ENCOUNTER — APPOINTMENT (OUTPATIENT)
Dept: ORTHOPEDIC SURGERY | Facility: CLINIC | Age: 69
End: 2024-08-07
Payer: MEDICARE

## 2024-08-07 DIAGNOSIS — M25.512 LEFT SHOULDER PAIN, UNSPECIFIED CHRONICITY: ICD-10-CM

## 2024-08-21 ENCOUNTER — APPOINTMENT (OUTPATIENT)
Dept: ORTHOPEDIC SURGERY | Facility: CLINIC | Age: 69
End: 2024-08-21
Payer: MEDICARE

## 2024-08-21 ENCOUNTER — HOSPITAL ENCOUNTER (OUTPATIENT)
Dept: RADIOLOGY | Facility: CLINIC | Age: 69
Discharge: HOME | End: 2024-08-21
Payer: MEDICARE

## 2024-08-21 VITALS — BODY MASS INDEX: 27.47 KG/M2 | WEIGHT: 175 LBS | HEIGHT: 67 IN

## 2024-08-21 DIAGNOSIS — M75.21 BICEPS TENDINITIS, RIGHT: ICD-10-CM

## 2024-08-21 DIAGNOSIS — M25.511 RIGHT SHOULDER PAIN, UNSPECIFIED CHRONICITY: ICD-10-CM

## 2024-08-21 DIAGNOSIS — M12.811 ROTATOR CUFF ARTHROPATHY, RIGHT: Primary | ICD-10-CM

## 2024-08-21 DIAGNOSIS — M19.011 LOCALIZED OSTEOARTHRITIS OF RIGHT SHOULDER: ICD-10-CM

## 2024-08-21 DIAGNOSIS — M19.011 OSTEOARTHRITIS OF GLENOHUMERAL JOINT, RIGHT: ICD-10-CM

## 2024-08-21 PROCEDURE — 1123F ACP DISCUSS/DSCN MKR DOCD: CPT | Performed by: STUDENT IN AN ORGANIZED HEALTH CARE EDUCATION/TRAINING PROGRAM

## 2024-08-21 PROCEDURE — L3670 SO ACRO/CLAV CAN WEB PRE OTS: HCPCS | Performed by: STUDENT IN AN ORGANIZED HEALTH CARE EDUCATION/TRAINING PROGRAM

## 2024-08-21 PROCEDURE — 99204 OFFICE O/P NEW MOD 45 MIN: CPT | Performed by: STUDENT IN AN ORGANIZED HEALTH CARE EDUCATION/TRAINING PROGRAM

## 2024-08-21 PROCEDURE — 3008F BODY MASS INDEX DOCD: CPT | Performed by: STUDENT IN AN ORGANIZED HEALTH CARE EDUCATION/TRAINING PROGRAM

## 2024-08-21 PROCEDURE — 73030 X-RAY EXAM OF SHOULDER: CPT | Mod: RT

## 2024-08-21 PROCEDURE — 1036F TOBACCO NON-USER: CPT | Performed by: STUDENT IN AN ORGANIZED HEALTH CARE EDUCATION/TRAINING PROGRAM

## 2024-08-21 PROCEDURE — 1159F MED LIST DOCD IN RCRD: CPT | Performed by: STUDENT IN AN ORGANIZED HEALTH CARE EDUCATION/TRAINING PROGRAM

## 2024-08-21 PROCEDURE — 1160F RVW MEDS BY RX/DR IN RCRD: CPT | Performed by: STUDENT IN AN ORGANIZED HEALTH CARE EDUCATION/TRAINING PROGRAM

## 2024-08-21 ASSESSMENT — PAIN - FUNCTIONAL ASSESSMENT: PAIN_FUNCTIONAL_ASSESSMENT: NO/DENIES PAIN

## 2024-08-21 NOTE — PROGRESS NOTES
PRIMARY CARE PHYSICIAN: Minal Varghese, APRN-CNS  REFERRING PROVIDER: No referring provider defined for this encounter.     CONSULT ORTHOPAEDIC: Shoulder Evaluation    ASSESSMENT & PLAN    Impression: 68 y.o. male with advanced right shoulder rotator cuff arthropathy, glenohumeral osteoarthritis and biceps tenosynovitis.    Plan:   I explained to the patient the nature of their diagnosis.  I reviewed their imaging studies with them.    Based on the history, physical exam and imaging studies above, the patient's presentation is consistent with the above diagnosis.  I had a long discussion with the patient regarding their presentation and the treatment options.  We discussed initial nonoperative versus operative management options as well as potential further diagnostic imaging.  I reviewed his x-ray findings with him.  His x-rays confirm the above.  He has advanced degenerative changes of the glenohumeral joint with findings consistent with rotator cuff arthropathy and biceps tendinitis.  I reviewed his treatment options.  He has attempted extensive nonoperative management including physical therapy for many months but has progressively lost function in the shoulder and has had worsening pain.  I believe he would benefit from a reverse total shoulder arthroplasty given his amount of pain and dysfunction.  The patient agrees.  After long discussion with the patient, he elected to proceed with surgical intervention of form of a right reverse total shoulder arthroplasty and biceps tenodesis.  I thoroughly explained the risks and benefits as well as the expected postoperative timeline for the proposed procedure versus nonoperative management. Risks of this procedure include but are not limited to bleeding, infection, nerve injury, DVT and failure of repair or implant.  The patient expressed understanding and wished to proceed with surgical intervention.  All questions were answered. We will begin the presurgical process  and find them a surgical date in a timely fashion that works for them.  In the meantime he will undergo the necessary preoperative clearances and a CT blueprint of the right shoulder for preoperative planning.    The patient will require an abduction sling for postoperative joint stability. Additionally, in order to decrease the amount of narcotic pain medication usage and improve their pain control and swelling, I recommend a cryotherapy machine.      At the end of the visit, all questions were answered in full. The patient is in agreement with the plan and recommendations. They will call the office with any questions/concerns.    Note dictated with [employer] software. Completed without full typed error editing and sent to avoid delay.       SUBJECTIVE  CHIEF COMPLAINT: right shoulder pain     HPI: Alen Cobian is a 68 y.o. patient who presents today with right shoulder pain. He has pain in the shoulder that radiates down the side of the arm towards the elbow.  He has significant pain and dysfunction with limitations in his ability to do anything arm extended or overhead. He has tried many months of physical therapy with his son who is a physical therapist with no improvement. No Hx of Sx or injections. XR done today.  He presents for surgical consultation.    They deny any associated neck pain.  No numbness, tingling, or paresthesias.    REVIEW OF SYSTEMS  Constitutional: See HPI for pain assessment, No significant weight loss, recent trauma  Cardiovascular: No chest pain, shortness of breath  Respiratory: No difficulty breathing, cough  Gastrointestinal: No nausea, vomiting, diarrhea, constipation  Musculoskeletal: Noted in HPI, positive for pain, restricted motion, stiffness  Integumentary: No rashes, easy bruising, redness   Neurological: no numbness or tingling in extremities, no gait disturbances   Psychiatric: No mood changes, memory changes, social issues  Heme/Lymph: no excessive  swelling, easy bruising, excessive bleeding  ENT: No hearing changes  Eyes: No vision changes    No past medical history on file.     No Known Allergies     No past surgical history on file.     No family history on file.     Social History     Socioeconomic History    Marital status:      Spouse name: Not on file    Number of children: Not on file    Years of education: Not on file    Highest education level: Not on file   Occupational History    Not on file   Tobacco Use    Smoking status: Former     Current packs/day: 0.00     Average packs/day: 0.5 packs/day for 1 year (0.5 ttl pk-yrs)     Types: Cigarettes     Start date:      Quit date:      Years since quittin.6    Smokeless tobacco: Never   Substance and Sexual Activity    Alcohol use: Never    Drug use: Never    Sexual activity: Not on file   Other Topics Concern    Not on file   Social History Narrative    Not on file     Social Determinants of Health     Financial Resource Strain: Not on file   Food Insecurity: Not on file   Transportation Needs: Not on file   Physical Activity: Not on file   Stress: Not on file   Social Connections: Not on file   Intimate Partner Violence: Not on file   Housing Stability: Not on file        CURRENT MEDICATIONS:   Current Outpatient Medications   Medication Sig Dispense Refill    amLODIPine (Norvasc) 2.5 mg tablet Take 1 tablet (2.5 mg) by mouth once daily. 90 tablet 3    chlorthalidone (Hygroton) 25 mg tablet Take 1 tablet (25 mg) by mouth once daily. 90 tablet 3    gabapentin (Neurontin) 300 mg capsule Take 1 capsule (300 mg) by mouth 2 times a day. 180 capsule 3    lisinopril 20 mg tablet Take 1 tablet (20 mg) by mouth 2 times a day. as directed 180 tablet 3     No current facility-administered medications for this visit.        OBJECTIVE    PHYSICAL EXAM      2022     2:20 PM 2022     2:17 PM 2023     7:55 AM 2023    10:22 AM 2023     7:11 AM 2024     7:10 AM 2024  "   11:01 AM   Vitals   Systolic 130 138 158 132 142 128 120   Diastolic 82 80 102 81 84 80 77   Heart Rate  75 77 66 87 79 77   Resp  16 18 16      Height (in)  1.702 m (5' 7\") 1.702 m (5' 7\") 1.702 m (5' 7\") 1.702 m (5' 7\") 1.702 m (5' 7\")    Weight (lb)  179.6 181 172.84 174 172 174.83   BMI  28.13 kg/m2 28.35 kg/m2 27.07 kg/m2 27.25 kg/m2 26.94 kg/m2 27.38 kg/m2   BSA (m2)  1.96 m2 1.97 m2 1.93 m2 1.93 m2 1.92 m2 1.94 m2   Visit Report   Report  Report Report Report      There is no height or weight on file to calculate BMI.    GENERAL: A/Ox3, NAD. Appears healthy, well nourished  PSYCHIATRIC: Mood stable, appropriate memory recall  EYES: EOM intact, no scleral icterus  CARDIOVASCULAR: Palpable peripheral pulses  LUNGS: Breathing non-labored on room air  SKIN: no erythema, rashes, or ecchymosis     MUSCULOSKELETAL:  Laterality: right Shoulder Exam  - Negative Spurlings, full painless neck ROM  - Skin intact  - No erythema or warmth  - No ecchymosis or soft tissue swelling  - Shoulder ROM: Forward flexion 130 with a hip to 90, ER 35, IR lower lumbar  - Strength:      Jobes 4-/5     ER 4/5     Belly press and bearhug 4+/5  - Palpation: Positive tenderness biceps groove and lateral acromion  - Emmanuel and Neers positive  - Speeds and O'Briens positive    NEUROVASCULAR:  - Neurovascular Status: sensation intact to light touch distally, upper extremity motor grossly intact  - Capillary refill brisk at extremities, Bilateral peripheral pulses 2+    Imaging: Multiple views of the affected right shoulder(s) demonstrate: Advanced right shoulder rotator cuff arthropathy and glenohumeral osteoarthritis.   X-rays were personally reviewed and interpreted by me.  Radiology reports were reviewed by me as well, if readily available at the time.      José Antonio Wright MD  Attending Surgeon    Sports Medicine Orthopaedic Surgery  [facility] " [facility]

## 2024-08-26 PROBLEM — M75.21 BICEPS TENDINITIS, RIGHT: Status: ACTIVE | Noted: 2024-08-21

## 2024-08-26 PROBLEM — M19.011 OSTEOARTHRITIS OF GLENOHUMERAL JOINT, RIGHT: Status: ACTIVE | Noted: 2024-08-21

## 2024-08-26 PROBLEM — M12.811 ROTATOR CUFF ARTHROPATHY, RIGHT: Status: ACTIVE | Noted: 2024-08-21

## 2024-09-04 ENCOUNTER — HOSPITAL ENCOUNTER (OUTPATIENT)
Dept: RADIOLOGY | Facility: HOSPITAL | Age: 69
Discharge: HOME | End: 2024-09-04
Payer: MEDICARE

## 2024-09-04 DIAGNOSIS — M19.011 LOCALIZED OSTEOARTHRITIS OF RIGHT SHOULDER: ICD-10-CM

## 2024-09-04 PROCEDURE — 73200 CT UPPER EXTREMITY W/O DYE: CPT | Mod: RT

## 2024-10-08 ENCOUNTER — PRE-ADMISSION TESTING (OUTPATIENT)
Dept: PREADMISSION TESTING | Facility: HOSPITAL | Age: 69
End: 2024-10-08
Payer: MEDICARE

## 2024-10-08 ENCOUNTER — LAB (OUTPATIENT)
Dept: LAB | Facility: LAB | Age: 69
End: 2024-10-08
Payer: MEDICARE

## 2024-10-08 VITALS
DIASTOLIC BLOOD PRESSURE: 77 MMHG | HEART RATE: 79 BPM | HEIGHT: 67 IN | RESPIRATION RATE: 14 BRPM | BODY MASS INDEX: 27.13 KG/M2 | TEMPERATURE: 97 F | WEIGHT: 172.84 LBS | OXYGEN SATURATION: 96 % | SYSTOLIC BLOOD PRESSURE: 123 MMHG

## 2024-10-08 DIAGNOSIS — Z01.818 PREOP TESTING: Primary | ICD-10-CM

## 2024-10-08 DIAGNOSIS — R73.09 ELEVATED GLUCOSE: ICD-10-CM

## 2024-10-08 DIAGNOSIS — Z01.818 PREOP TESTING: ICD-10-CM

## 2024-10-08 DIAGNOSIS — M19.011 LOCALIZED OSTEOARTHRITIS OF RIGHT SHOULDER: ICD-10-CM

## 2024-10-08 LAB
ALBUMIN SERPL BCP-MCNC: 4.6 G/DL (ref 3.4–5)
ALP SERPL-CCNC: 56 U/L (ref 33–136)
ALT SERPL W P-5'-P-CCNC: 26 U/L (ref 10–52)
ANION GAP SERPL CALC-SCNC: 14 MMOL/L (ref 10–20)
AST SERPL W P-5'-P-CCNC: 23 U/L (ref 9–39)
BILIRUB SERPL-MCNC: 0.6 MG/DL (ref 0–1.2)
BUN SERPL-MCNC: 27 MG/DL (ref 6–23)
CALCIUM SERPL-MCNC: 9.9 MG/DL (ref 8.6–10.3)
CHLORIDE SERPL-SCNC: 105 MMOL/L (ref 98–107)
CO2 SERPL-SCNC: 25 MMOL/L (ref 21–32)
CREAT SERPL-MCNC: 0.87 MG/DL (ref 0.5–1.3)
EGFRCR SERPLBLD CKD-EPI 2021: >90 ML/MIN/1.73M*2
ERYTHROCYTE [DISTWIDTH] IN BLOOD BY AUTOMATED COUNT: 13.2 % (ref 11.5–14.5)
EST. AVERAGE GLUCOSE BLD GHB EST-MCNC: 123 MG/DL
GLUCOSE SERPL-MCNC: 106 MG/DL (ref 74–99)
HBA1C MFR BLD: 5.9 %
HCT VFR BLD AUTO: 44.1 % (ref 41–52)
HGB BLD-MCNC: 14.6 G/DL (ref 13.5–17.5)
MCH RBC QN AUTO: 28 PG (ref 26–34)
MCHC RBC AUTO-ENTMCNC: 33.1 G/DL (ref 32–36)
MCV RBC AUTO: 85 FL (ref 80–100)
NRBC BLD-RTO: NORMAL /100{WBCS}
PLATELET # BLD AUTO: 378 X10*3/UL (ref 150–450)
POTASSIUM SERPL-SCNC: 4.1 MMOL/L (ref 3.5–5.3)
PROT SERPL-MCNC: 7 G/DL (ref 6.4–8.2)
RBC # BLD AUTO: 5.22 X10*6/UL (ref 4.5–5.9)
SODIUM SERPL-SCNC: 140 MMOL/L (ref 136–145)
WBC # BLD AUTO: 6.9 X10*3/UL (ref 4.4–11.3)

## 2024-10-08 PROCEDURE — 99204 OFFICE O/P NEW MOD 45 MIN: CPT | Performed by: PHYSICIAN ASSISTANT

## 2024-10-08 PROCEDURE — 83036 HEMOGLOBIN GLYCOSYLATED A1C: CPT

## 2024-10-08 PROCEDURE — 80053 COMPREHEN METABOLIC PANEL: CPT

## 2024-10-08 PROCEDURE — 36415 COLL VENOUS BLD VENIPUNCTURE: CPT

## 2024-10-08 PROCEDURE — 85027 COMPLETE CBC AUTOMATED: CPT

## 2024-10-08 PROCEDURE — 87081 CULTURE SCREEN ONLY: CPT | Mod: BEALAB

## 2024-10-08 RX ORDER — CHLORHEXIDINE GLUCONATE ORAL RINSE 1.2 MG/ML
SOLUTION DENTAL
Qty: 473 ML | Refills: 0 | Status: SHIPPED | OUTPATIENT
Start: 2024-10-08

## 2024-10-08 RX ORDER — CHLORHEXIDINE GLUCONATE 40 MG/ML
SOLUTION TOPICAL DAILY
Qty: 470 ML | Refills: 0 | Status: SHIPPED | OUTPATIENT
Start: 2024-10-08 | End: 2024-10-13

## 2024-10-08 RX ORDER — IBUPROFEN 200 MG
TABLET ORAL EVERY 6 HOURS PRN
COMMUNITY

## 2024-10-08 ASSESSMENT — DUKE ACTIVITY SCORE INDEX (DASI)
CAN YOU WALK A BLOCK OR TWO ON LEVEL GROUND: YES
CAN YOU DO LIGHT WORK AROUND THE HOUSE LIKE DUSTING OR WASHING DISHES: YES
CAN YOU PARTICIPATE IN MODERATE RECREATIONAL ACTIVITIES LIKE GOLF, BOWLING, DANCING, DOUBLES TENNIS OR THROWING A BASEBALL OR FOOTBALL: YES
DASI METS SCORE: 9.9
CAN YOU DO MODERATE WORK AROUND THE HOUSE LIKE VACUUMING, SWEEPING FLOORS OR CARRYING GROCERIES: YES
CAN YOU RUN A SHORT DISTANCE: YES
CAN YOU TAKE CARE OF YOURSELF (EAT, DRESS, BATHE, OR USE TOILET): YES
CAN YOU CLIMB A FLIGHT OF STAIRS OR WALK UP A HILL: YES
TOTAL_SCORE: 58.2
CAN YOU HAVE SEXUAL RELATIONS: YES
CAN YOU WALK INDOORS, SUCH AS AROUND YOUR HOUSE: YES
CAN YOU DO YARD WORK LIKE RAKING LEAVES, WEEDING OR PUSHING A MOWER: YES
CAN YOU DO HEAVY WORK AROUND THE HOUSE LIKE SCRUBBING FLOORS OR LIFTING AND MOVING HEAVY FURNITURE: YES
CAN YOU PARTICIPATE IN STRENOUS SPORTS LIKE SWIMMING, SINGLES TENNIS, FOOTBALL, BASKETBALL, OR SKIING: YES

## 2024-10-08 ASSESSMENT — ENCOUNTER SYMPTOMS
EYES NEGATIVE: 1
CONSTITUTIONAL NEGATIVE: 1
NECK NEGATIVE: 1
NEUROLOGICAL NEGATIVE: 1
ENDOCRINE NEGATIVE: 1
GASTROINTESTINAL NEGATIVE: 1
RESPIRATORY NEGATIVE: 1
CARDIOVASCULAR NEGATIVE: 1

## 2024-10-08 ASSESSMENT — LIFESTYLE VARIABLES: SMOKING_STATUS: NONSMOKER

## 2024-10-08 ASSESSMENT — PAIN DESCRIPTION - DESCRIPTORS: DESCRIPTORS: BURNING

## 2024-10-08 ASSESSMENT — PAIN - FUNCTIONAL ASSESSMENT: PAIN_FUNCTIONAL_ASSESSMENT: 0-10

## 2024-10-08 ASSESSMENT — PAIN SCALES - GENERAL: PAINLEVEL_OUTOF10: 8

## 2024-10-08 NOTE — PREPROCEDURE INSTRUCTIONS
Medication List            Accurate as of October 8, 2024 12:59 PM. Always use your most recent med list.                amLODIPine 2.5 mg tablet  Commonly known as: Norvasc  Take 1 tablet (2.5 mg) by mouth once daily.  Medication Adjustments for Surgery: Take/Use as prescribed     chlorthalidone 25 mg tablet  Commonly known as: Hygroton  Take 1 tablet (25 mg) by mouth once daily.  Medication Adjustments for Surgery: Take/Use as prescribed     gabapentin 300 mg capsule  Commonly known as: Neurontin  Take 1 capsule (300 mg) by mouth 2 times a day.  Medication Adjustments for Surgery: Take/Use as prescribed     ibuprofen 200 mg tablet  Additional Medication Adjustments for Surgery: Take last dose 7 days before surgery  Notes to patient: Last dose 10/21/24     lisinopril 20 mg tablet  Take 1 tablet (20 mg) by mouth 2 times a day. as directed  Medication Adjustments for Surgery: Take last dose 1 day (24 hours) before surgery  Notes to patient: Last dose 10/28/24 morning - 24 hour hold                     Thank you for visiting [facility].  If you have any changes to your health condition, please call the surgeons office to alert them and give them details of your symptoms.    Galina Mast PA-C  P: [phone]  Department of Anesthesiology and Perioperative Medicine  --    Preoperative Fasting Guidelines    Why must I stop eating and drinking near surgery time?  With sedation, food or liquid in your stomach can enter your lungs causing serious complications  Increases nausea and vomiting    When do I need to stop eating and drinking before my surgery?  Do not eat any food after midnight the night before your surgery/procedure.  You may have up to 13.5 ounces of clear liquid until TWO hours before your instructed arrival time to the hospital.  This includes water, black tea/coffee, (no milk or cream) apple juice, and electrolyte drinks (Gatorade)  You may chew gum until TWO hours before your  surgery/procedure              Preoperative Brain Exercises    What are brain exercises?  A brain exercise is any activity that engages your thinking (cognitive) skills.    What types of activities are considered brain exercises?  Jigsaw puzzles, crossword puzzles, word jumble, memory games, word search, and many more.  Many can be found free online or on your phone via a mobile karmen.    Why should I do brain exercises before my surgery?  More recent research has shown brain exercise before surgery can lower the risk of postoperative delirium (confusion) which can be especially important for older adults.  Patients who did brain exercises for 5 to 10 hours the days before surgery, cut their risk of postoperative delirium in half up to 1 week after surgery.                  [facility]    Preoperative Deep Breathing Exercises    Why it is important to do deep breathing exercises before my surgery?  Deep breathing exercises strengthen your breathing muscles.  This helps you to recover after your surgery and decreases the chance of breathing complications.      How are the deep breathing exercises done?  Sit straight with your back supported.  Breathe in deeply and slowly through your nose. Your lower rib cage should expand and your abdomen may move forward.  Hold that breath for 3 to 5 seconds.  Breathe out through pursed lips, slowly and completely.  Rest and repeat 10 times every hour while awake.  Rest longer if you become dizzy or lightheaded.      Patient Information: Incentive Spirometer  What is an incentive spirometer?  An incentive spirometer is a device used before and after surgery to “exercise” your lungs.  It helps you to take deeper breaths to expand your lungs.  Below is an example of a basic incentive spirometer.  The device you receive may differ slightly but they all function the same.    Why do I need to use an incentive spirometer?  Using your incentive spirometer prepares  your lungs for surgery and helps prevent lung problems after surgery.  How do I use my incentive spirometer?  When you're using your incentive spirometer, make sure to breathe through your mouth. If you breathe through your nose, the incentive spirometer won't work properly. You can hold your nose if you have trouble.  If you feel dizzy at any time, stop and rest. Try again at a later time.  Follow the steps below:  Set up your incentive spirometer, expand the flexible tubing and connect to the outlet.  Sit upright in a chair or bed. Hold the incentive spirometer at eye level.   Put the mouthpiece in your mouth and close your lips tightly around it. Slowly breathe out (exhale) completely.  Breathe in (inhale) slowly through your mouth as deeply as you can. As you take a breath, you will see the piston rise inside the large column. While the piston rises, the indicator should move upwards. It should stay in between the 2 arrows (see Figure).  Try to get the piston as high as you can, while keeping the indicator between the arrows.   If the indicator doesn't stay between the arrows, you're breathing either too fast or too slow.  When you get it as high as you can, hold your breath for 10 seconds, or as long as possible. While you're holding your breath, the piston will slowly fall to the base of the spirometer.  Once the piston reaches the bottom of the spirometer, breathe out slowly through your mouth. Rest for a few seconds.  Repeat 10 times. Try to get the piston to the same level with each breath.  Repeat every hour while awake  You can carefully clean the outside of the mouthpiece with an alcohol wipe or soap and water.            Patient and Family Education             Ways You Can Help Prevent Blood Clots             This handout explains some simple things you can do to help prevent blood clots.      Blood clots are blockages that can form in the body's veins. When a blood clot forms in your deep veins, it  may be called a deep vein thrombosis, or DVT for short. Blood clots can happen in any part of the body where blood flows, but they are most common in the arms and legs. If a piece of a blood clot breaks free and travels to the lungs, it is called a pulmonary embolus (PE). A PE can be a very serious problem.         Being in the hospital or having surgery can raise your chances of getting a blood clot because you may not be well enough to move around as much as you normally do.         Ways you can help prevent blood clots in the hospital         Wearing SCDs. SCDs stands for Sequential Compression Devices.   SCDs are special sleeves that wrap around your legs  They attach to a pump that fills them with air to gently squeeze your legs every few minutes.   This helps return the blood in your legs to your heart.   SCDs should only be taken off when walking or bathing.   SCDs may not be comfortable, but they can help save your life.               Wearing compression stockings - if your doctor orders them. These special snug fitting stockings gently squeeze your legs to help blood flow.       Walking. Walking helps move the blood in your legs.   If your doctor says it is ok, try walking the halls at least   5 times a day. Ask us to help you get up, so you don't fall.      Taking any blood thinning medicines your doctor orders.             ©[facility]; 3/23       Ways you can help prevent blood clots at home       Wearing compression stockings - if your doctor orders them. ? Walking - to help move the blood in your legs.       Taking any blood thinning medicines your doctor orders.      Signs of a blood clot or PE      Tell your doctor or nurse know right away if you have of the problems listed below.    If you are at home, seek medical care right away. Call 911 for chest pain or problems breathing.               Signs of a blood clot (DVT) - such as pain,  swelling, redness or warmth in your arm or leg       Signs of a pulmonary embolism (PE) - such as chest     pain or feeling short of breath           The Week before Surgery        Seven days before Surgery  Check your CPM medication instructions  Do the exercises provided to you by CPM   Arrange for a responsible, adult licensed  to take you home after surgery and stay with you for 24 hours.  You will not be permitted to drive yourself home if you have received any anesthetic/sedation  Six days before surgery  Check your CPM medication instructions  Do the exercises provided to you by CPM   Start using Chlorhexidene (CHG) body wash if prescribed  Five days before surgery  Check your CPM medication instructions  Do the exercises provided to you by CPM   Continue to use CHG body wash if prescribed  Three days before surgery  Check your CPM medication instructions  Do the exercises provided to you by CPM   Continue to use CHG body wash if prescribed  Two days before surgery  Check your CPM medication instructions  Do the exercises provided to you by CPM   Continue to use CHG body wash if prescribed    The Day before Surgery       Check your CPM medication and all other CPM instructions including when to stop eating and drinking  You will be called with your arrival time for surgery in the late afternoon.  If you do not receive a call please reach out to your surgeon's office.  Do not smoke or drink 24 hours before surgery  Prepare items to bring with you to the hospital  Shower with your chlorhexidine wash if prescribed  Brush your teeth and use your chlorhexidine dental rinse if prescribed    The Day of Surgery       Check your CPM medication instructions  Ensure you follow the instructions for when to stop eating and drinking  Shower, if prescribed use CHG.  Do not apply any lotions, creams, moisturizers, perfume or deodorant  Brush your teeth and use your CHG dental rinse if prescribed  Wear loose comfortable clothing  Avoid make-up  Remove  jewelry and  piercings, consider professional piercing removal with a plastic spacer if needed  Bring photo ID and Insurance card  Bring an accurate medication list that includes medication dose, frequency and allergies  Bring a copy of your advanced directives (will, health care power of )  Bring any devices and controllers as well as medical devices you have been provided with for surgery (CPAP, slings, braces, etc.)  Dentures, eyeglasses, and contacts will be removed before surgery, please bring cases for contacts or glasses        Patient Information: Pre-Operative Infection Prevention Measures     Why did I have my nose, under my arms, and groin swabbed?  The purpose of the swab is to identify Staphylococcus aureus inside your nose or on your skin.  The swab was sent to the laboratory for culture.  A positive swab/culture for Staphylococcus aureus is called colonization or carriage.      What is Staphylococcus aureus?  Staphylococcus aureus, also known as “staph”, is a germ found on the skin or in the nose of healthy people.  Sometimes Staphylococcus aureus can get into the body and cause an infection.  This can be minor (such as pimples, boils, or other skin problems).  It might also be serious (such as a blood infection, pneumonia, or a surgical site infection).    What is Staphylococcus aureus colonization or carriage?  Colonization or carriage means that a person has the germ but is not sick from it.  These bacteria can be spread on the hands or when breathing or sneezing.    How is Staphylococcus aureus spread?  It is most often spread by close contact with a person or item that carries it.    What happens if my culture is positive for Staphylococcus aureus?  Your doctor/medical team will use this information to guide any antibiotic treatment which may be necessary.  Regardless of the culture results, we will clean the inside of your nose with a betadine swab just before you have your surgery.      Will I get  an infection if I have Staphylococcus aureus in my nose or on my skin?  Anyone can get an infection with Staphylococcus aureus.  However, the best way to reduce your risk of infection is to follow the instructions provided to you for the use of your CHG soap and dental rinse.        Patient Information: Oral/Dental Rinse    What is oral/dental rinse?   It is a mouthwash. It is a way of cleaning the mouth with a germ-killing solution before your surgery.  The solution contains chlorhexidine, commonly known as CHG.   It is used inside the mouth to kill a bacteria known as Staphylococcus aureus.  Let your doctor know if you are allergic to Chlorhexidine.    Why do I need to use CHG oral/dental rinse?  The CHG oral/dental rinse helps to kill a bacteria in your mouth known as Staphylococcus aureus.     This reduces the risk of infection at the surgical site.      Using your CHG oral/dental rinse  STEPS:  Use your CHG oral/dental rinse after you brush your teeth the night before (at bedtime) and the morning of your surgery.  Follow all directions on your prescription label.    Use the cap on the container to measure 15ml   Swish (gargle if you can) the mouthwash in your mouth for at least 30 seconds, (do not swallow) and spit out  After you use your CHG rinse, do not rinse your mouth with water, drink or eat.  Please refer to the prescription label for the appropriate time to resume oral intake      What side effects might I have using the CHG oral/dental rinse?  CHG rinse will stick to plaque on the teeth.  Brush and floss just before use.  Teeth brushing will help avoid staining of plaque during use.      Patient Information: Home Preoperative Antibacterial Shower      What is a home preoperative antibacterial shower?  This shower is a way of cleaning the skin with a germ-killing solution before surgery.  The solution contains chlorhexidine, commonly known as CHG.  CHG is a skin cleanser with germ-killing ability.  Let  your doctor know if you are allergic to chlorhexidine.    Why do I need to take a preoperative antibacterial shower?  Skin is not sterile.  It is best to try to make your skin as free of germs as possible before surgery.  Proper cleansing with a germ-killing soap before surgery can lower the number of germs on your skin.  This helps to reduce the risk of infection at the surgical site.  Following the instructions listed below will help you prepare your skin for surgery.      How do I use the solution?  Steps:  Begin using your CHG soap 5 days before your scheduled surgery on _______10/25/2024_________________.    First, wash and rinse your hair using the CHG soap. Keep CHG soap away from ear canals and eyes.  Rinse completely, do not condition.  Hair extensions should be removed.  Wash your face with your normal soap and rinse.    Apply the CHG solution to a clean wet washcloth.  Turn the water off or move away from the water spray to avoid premature rinsing of the CHG soap as you are applying.   Firmly lather your entire body from the neck down.  Do not use on your face.  Pay special attention to the area(s) where your incision(s) will be located unless they are on your face.  Avoid scrubbing your skin too hard.  The important point is to have the CHG soap sit on your skin for 3 minutes.    When the 3 minutes are up, turn on the water and rinse the CHG solution off your body completely.   DO NOT wash with regular soap after you have used the CHG soap solution  Pat yourself dry with a clean, freshly-laundered towel.  DO NOT apply powders, deodorants, or lotions.  Dress in clean, freshly laundered nightclothes.    Be sure to sleep with clean, freshly laundered sheets.  Be aware that CHG will cause stains on fabrics; if you wash them with bleach after use.  Rinse your washcloth and other linens that have contact with CHG completely.  Use only non-chlorine detergents to launder the items used.   The morning of surgery  is the fifth day.  Repeat the above steps and dress in clean comfortable clothing     Whom should I contact if I have any questions regarding the use of CHG soap?  Call the [facility]

## 2024-10-08 NOTE — CPM/PAT H&P
CPM/PAT Evaluation       Name: Alen Cobian (Alen Cobian)  /Age: 1955/69 y.o.     Visit Type:   In-Person       Chief Complaint: right shoulder pain    HPI: Patient is a 68 yo M scheduled for right reverse total shoulder on 10/29/2024 with Dr. Wright secondary to rotator cuff and osteoarthritis of right shoulder. Pt notes he takes ibuprofen for the pain. Patient was referred by Dr. Wright to CPM today for perioperative risk stratification and optimization. Patient's PMHx is notable for HTN, arthritis, carpal tunnel syndrome.      Past Medical History:   Diagnosis Date    Hypertension        Past Surgical History:   Procedure Laterality Date    CARPAL TUNNEL RELEASE Bilateral 2023       Patient  has no history on file for sexual activity.    Family History   Problem Relation Name Age of Onset    Heart disease Mother      Heart disease Father         No Known Allergies    Prior to Admission medications    Medication Sig Start Date End Date Taking? Authorizing Provider   amLODIPine (Norvasc) 2.5 mg tablet Take 1 tablet (2.5 mg) by mouth once daily. 12/4/23 12/3/24 Yes AUSTYN Davidson-[facility]   chlorthalidone (Hygroton) 25 mg tablet Take 1 tablet (25 mg) by mouth once daily. 24 Yes KIMBERLY Davidson   gabapentin (Neurontin) 300 mg capsule Take 1 capsule (300 mg) by mouth 2 times a day. 24 Yes KIMBERLY Davidson   ibuprofen 200 mg tablet Take by mouth every 6 hours if needed for mild pain (1 - 3).   Yes Historical Provider, MD   lisinopril 20 mg tablet Take 1 tablet (20 mg) by mouth 2 times a day. as directed 24 Yes KIMBERLY Davidson   chlorhexidine (Hibiclens) 4 % external liquid Apply topically once daily for 5 days. Start using 10/25/2024 10/8/24 10/13/24  Galina Mast PA-C   chlorhexidine (Peridex) 0.12 % solution Swish and spit 15 mL night before surgery and morning of surgery 10/8/24   Galina Mast PA-C        PAT ROS:   Constitutional:    neg    Neuro/Psych:   neg    Eyes:   neg    Ears:   neg    Nose:   neg    Mouth:   neg    Throat:   neg    Neck:   neg    Cardio:   neg    Respiratory:   neg    Endocrine:   neg    GI:   neg    :   neg    Musculoskeletal:    +Right shoulder pain  Hematologic:   neg    Skin:  neg        Physical Exam  Vitals and nursing note reviewed.   Constitutional:       General: He is not in acute distress.     Appearance: He is normal weight. He is not ill-appearing or toxic-appearing.   HENT:      Head: Normocephalic and atraumatic.      Right Ear: External ear normal.      Left Ear: External ear normal.      Nose: Nose normal.      Mouth/Throat:      Mouth: Mucous membranes are moist.   Eyes:      Extraocular Movements: Extraocular movements intact.      Conjunctiva/sclera: Conjunctivae normal.   Neck:      Vascular: No carotid bruit.   Cardiovascular:      Rate and Rhythm: Normal rate and regular rhythm.      Pulses: Normal pulses.      Heart sounds: Normal heart sounds.   Pulmonary:      Effort: Pulmonary effort is normal.      Breath sounds: Normal breath sounds.   Abdominal:      General: There is no distension.      Palpations: Abdomen is soft.      Tenderness: There is no abdominal tenderness.   Musculoskeletal:         General: Normal range of motion.      Cervical back: Normal range of motion.   Skin:     General: Skin is warm and dry.      Capillary Refill: Capillary refill takes less than 2 seconds.   Neurological:      General: No focal deficit present.      Mental Status: He is alert.   Psychiatric:         Mood and Affect: Mood normal.         Behavior: Behavior normal.          PAT AIRWAY:   Airway:     Mallampati::  II    TM distance::  >3 FB    Neck ROM::  Full      Visit Vitals  /77   Pulse 79   Temp 36.1 °C (97 °F) (Temporal)   Resp 14       DASI Risk Score      Flowsheet Row Pre-Admission Testing from 10/8/2024 in [facility]   Can you take care of yourself (eat, dress, bathe, or  use toilet)?  2.75 filed at 10/08/2024 1313   Can you walk indoors, such as around your house? 1.75 filed at 10/08/2024 1313   Can you walk a block or two on level ground?  2.75 filed at 10/08/2024 1313   Can you climb a flight of stairs or walk up a hill? 5.5 filed at 10/08/2024 1313   Can you run a short distance? 8 filed at 10/08/2024 1313   Can you do light work around the house like dusting or washing dishes? 2.7 filed at 10/08/2024 1313   Can you do moderate work around the house like vacuuming, sweeping floors or carrying groceries? 3.5 filed at 10/08/2024 1313   Can you do heavy work around the house like scrubbing floors or lifting and moving heavy furniture?  8 filed at 10/08/2024 1313   Can you do yard work like raking leaves, weeding or pushing a mower? 4.5 filed at 10/08/2024 1313   Can you have sexual relations? 5.25 filed at 10/08/2024 1313   Can you participate in moderate recreational activities like golf, bowling, dancing, doubles tennis or throwing a baseball or football? 6 filed at 10/08/2024 1313   Can you participate in strenous sports like swimming, singles tennis, football, basketball, or skiing? 7.5 filed at 10/08/2024 1313   DASI SCORE 58.2 filed at 10/08/2024 1313   METS Score (Will be calculated only when all the questions are answered) 9.9 filed at 10/08/2024 1313          Caprini DVT Assessment      Flowsheet Row Pre-Admission Testing from 10/8/2024 in [facility]   DVT Score 6 filed at 10/08/2024 1313   Surgical Factors Major surgery planned, lasting 2-3 hours filed at 10/08/2024 1313   BMI 30 or less filed at 10/08/2024 1313          Modified Frailty Index      Flowsheet Row Pre-Admission Testing from 10/8/2024 in [facility]   Non-independent functional status (problems with dressing, bathing, personal grooming, or cooking) 0 filed at 10/08/2024 1315   History of diabetes mellitus  0 filed at 10/08/2024 1315   History of COPD 0 filed at 10/08/2024 1315    History of MI 0 filed at 10/08/2024 1315   History of Percutaneous Coronary Intervention, Cardiac Surgery, or Angina No filed at 10/08/2024 1315   Hypertension requiring the use of medication  0.0909 filed at 10/08/2024 1315   Peripheral vascular disease 0 filed at 10/08/2024 1315   Impaired sensorium (cognitive impairement or loss, clouding, or delirium) 0 filed at 10/08/2024 1315   TIA or CVA withouy residual deficit 0 filed at 10/08/2024 1315   Cerebrovascular accident with deficit 0 filed at 10/08/2024 1315          CHADS2 Stroke Risk  Current as of 18 minutes ago        N/A 3 to 100%: High Risk   2 to < 3%: Medium Risk   0 to < 2%: Low Risk     Last Change: N/A          This score determines the patient's risk of having a stroke if the patient has atrial fibrillation.        This score is not applicable to this patient. Components are not calculated.          Revised Cardiac Risk Index      Flowsheet Row Pre-Admission Testing from 10/8/2024 in [facility]   High-Risk Surgery (Intraperitoneal, Intrathoracic,Suprainguinal vascular) 0 filed at 10/08/2024 1315   History of ischemic heart disease (History of MI, History of positive exercuse test, Current chest paint considered due to myocardial ischemia, Use of nitrate therapy, ECG with pathological Q Waves) 0 filed at 10/08/2024 1315   History of congestive heart failure (pulmonary edemia, bilateral rales or S3 gallop, Paroxysmal nocturnal dyspnea, CXR showing pulmonary vascular redistribution) 0 filed at 10/08/2024 1315   History of cerebrovascular disease (Prior TIA or stroke) 0 filed at 10/08/2024 1315   Pre-operative insulin treatment 0 filed at 10/08/2024 1315   Pre-operative creatinine>2 mg/dl 0 filed at 10/08/2024 1315   Revised Cardiac Risk Calculator 0 filed at 10/08/2024 1315          Apfel Simplified Score      Flowsheet Row Pre-Admission Testing from 10/8/2024 in [facility]   Smoking status 1 filed at 10/08/2024 1315    History of motion sickness or PONV  1 filed at 10/08/2024 1315   Use of postoperative opioids 0 filed at 10/08/2024 1315   Gender - Female 0=No filed at 10/08/2024 1315   Apfel Simplified Score Calculator 2 filed at 10/08/2024 1315          Risk Analysis Index Results This Encounter    No data found in the last 10 encounters.       Stop Bang Score      Flowsheet Row Pre-Admission Testing from 10/8/2024 in [place]   Do you snore loudly? 1 filed at 10/08/2024 1240   Do you often feel tired or fatigued after your sleep? 0 filed at 10/08/2024 1240   Has anyone ever observed you stop breathing in your sleep? 0 filed at 10/08/2024 1240   Do you have or are you being treated for high blood pressure? 1 filed at 10/08/2024 1240   Recent BMI (Calculated) 27.1 filed at 10/08/2024 1240   Is BMI greater than 35 kg/m2? 0=No filed at 10/08/2024 1240   Age older than 50 years old? 1=Yes filed at 10/08/2024 1240   Is your neck circumference greater than 17 inches (Male) or 16 inches (Female)? 0 filed at 10/08/2024 1240   Gender - Male 1=Yes filed at 10/08/2024 1240   STOP-BANG Total Score 4 filed at 10/08/2024 1240              Assessment and Plan:     Neuro:  No neurologic diagnoses, however, the patient is at an increased risk for post operative delirium secondary to age >/= 65.  Preoperative brain exercise educational handout provided to patient.    The patient is at an increased risk for perioperative stroke secondary to HTN.    HEENT/Airway: No diagnosis or significant findings on chart review or clinical presentation and evaluation.    Cardiovascular:    -HTN on lisinopril (24 hour hold), chlorthalidone (continue), amlodipine (continue)  No additional preoperative testing is currently indicated.  ASA2  EKG reviewed from 6/8/24 normal sinus  METS are 9.9  RCRI  0 which is 3.9%  30 day risk of MACE (risk for cardiac death, nonfatal myocardial infarction, and nonfactal cardiac arrest  GAYE score which  indicates a  0.2 % risk of intraoperative or 30-day postoperative MACE  Cardiac clearance - scanned into media tab, patient cleared for surgery from Kathrin ZAMAN      Pulmonary:  No diagnosis or significant findings on chart review or clinical presentation and evaluation.  Preoperative deep breathing educational handout provided to patient.    ARISCAT:  19    points which is a low (1.6%) risk of in-hospital post-op pulmonary complications   PRODIGY:  20  points which is a high risk of post op opioid induced respiratory depression episodes  STOP BAN   points which is a intermediate risk for moderate to severe SARAH    Renal: No diagnosis or significant findings on chart review or clinical presentation and evaluation, however, the patient is at increased risk of perioperative renal complications secondary to age>/= 56, male sex, and HTN. Preventative measures include preoperative hydration, BP control  CMP ordered today    Endocrine:  No diagnosis or significant findings on chart review or clinical presentation and evaluation.     Hematologic:  No diagnosis or significant findings on chart review or clinical presentation and evaluation.  Preoperative DVT educational handout provided to patient.    Caprini Score:  6  points which is a high risk of perioperative VTE    Gastrointestinal:   No diagnosis or significant findings on chart review or clinical presentation and evaluation.    Apfel: 2 points 39% risk for post operative N/V    Infectious disease:    Patient provided with rx for CHG body wash and Chlorhexidine .12% Dental Rinse. E-prescribed per  infection prevention protocol. CHG use instructions reviewed and provided to patient. Patient educated.   Patient advised to call [facility] if rx not received.   Staph screen collected    Musculoskeletal:    -osteoarthritis of right shoulder - scheduled for surgery  -carpal tunnel syndrome- takes gabapentin for neuropathy (continue)    Anesthesia:  No  anesthesia complications  No dental issues  No tobacco use  No personal/family issues with Anesthesia  No nickel, shell fish, or iodine allergies    Discussed with patient medication instructions, NPO guidelines, and any questions or concerns. Advised on stopping NSAIDs 7 days prior to surgery. Patient does not need further workup prior to preceding with elective surgery based on based on risk assessment.         Galina Mast PA-C 10/8/2024 1:31 PM      Pending labs ordered:  cbc, comp, A1c, and MSSA/MRSA culture  Follow up needed: labs

## 2024-10-10 LAB — STAPHYLOCOCCUS SPEC CULT: NORMAL

## 2024-10-29 ENCOUNTER — APPOINTMENT (OUTPATIENT)
Dept: RADIOLOGY | Facility: HOSPITAL | Age: 69
End: 2024-10-29
Payer: MEDICARE

## 2024-10-29 ENCOUNTER — HOSPITAL ENCOUNTER (OUTPATIENT)
Facility: HOSPITAL | Age: 69
Setting detail: OUTPATIENT SURGERY
Discharge: HOME | End: 2024-10-29
Attending: STUDENT IN AN ORGANIZED HEALTH CARE EDUCATION/TRAINING PROGRAM | Admitting: STUDENT IN AN ORGANIZED HEALTH CARE EDUCATION/TRAINING PROGRAM
Payer: MEDICARE

## 2024-10-29 ENCOUNTER — ANESTHESIA EVENT (OUTPATIENT)
Dept: OPERATING ROOM | Facility: HOSPITAL | Age: 69
End: 2024-10-29
Payer: MEDICARE

## 2024-10-29 ENCOUNTER — TELEPHONE (OUTPATIENT)
Dept: ORTHOPEDIC SURGERY | Facility: CLINIC | Age: 69
End: 2024-10-29

## 2024-10-29 ENCOUNTER — ANESTHESIA (OUTPATIENT)
Dept: OPERATING ROOM | Facility: HOSPITAL | Age: 69
End: 2024-10-29
Payer: MEDICARE

## 2024-10-29 VITALS
TEMPERATURE: 97.5 F | WEIGHT: 174.16 LBS | HEART RATE: 85 BPM | OXYGEN SATURATION: 98 % | SYSTOLIC BLOOD PRESSURE: 140 MMHG | RESPIRATION RATE: 16 BRPM | HEIGHT: 67 IN | DIASTOLIC BLOOD PRESSURE: 80 MMHG | BODY MASS INDEX: 27.34 KG/M2

## 2024-10-29 DIAGNOSIS — M75.21 BICEPS TENDINITIS, RIGHT: Primary | ICD-10-CM

## 2024-10-29 DIAGNOSIS — M19.011 OSTEOARTHRITIS OF GLENOHUMERAL JOINT, RIGHT: ICD-10-CM

## 2024-10-29 DIAGNOSIS — M19.011 LOCALIZED OSTEOARTHRITIS OF RIGHT SHOULDER: Primary | ICD-10-CM

## 2024-10-29 DIAGNOSIS — M12.811 ROTATOR CUFF ARTHROPATHY, RIGHT: ICD-10-CM

## 2024-10-29 PROCEDURE — 3600000005 HC OR TIME - INITIAL BASE CHARGE - PROCEDURE LEVEL FIVE: Performed by: STUDENT IN AN ORGANIZED HEALTH CARE EDUCATION/TRAINING PROGRAM

## 2024-10-29 PROCEDURE — 2500000004 HC RX 250 GENERAL PHARMACY W/ HCPCS (ALT 636 FOR OP/ED): Performed by: INTERNAL MEDICINE

## 2024-10-29 PROCEDURE — C1713 ANCHOR/SCREW BN/BN,TIS/BN: HCPCS | Performed by: STUDENT IN AN ORGANIZED HEALTH CARE EDUCATION/TRAINING PROGRAM

## 2024-10-29 PROCEDURE — 64415 NJX AA&/STRD BRCH PLXS IMG: CPT | Performed by: ANESTHESIOLOGY

## 2024-10-29 PROCEDURE — 7100000010 HC PHASE TWO TIME - EACH INCREMENTAL 1 MINUTE: Performed by: STUDENT IN AN ORGANIZED HEALTH CARE EDUCATION/TRAINING PROGRAM

## 2024-10-29 PROCEDURE — A23472 PR RECONSTR TOTAL SHOULDER IMPLANT: Performed by: ANESTHESIOLOGY

## 2024-10-29 PROCEDURE — 2500000005 HC RX 250 GENERAL PHARMACY W/O HCPCS: Performed by: INTERNAL MEDICINE

## 2024-10-29 PROCEDURE — A4649 SURGICAL SUPPLIES: HCPCS | Performed by: STUDENT IN AN ORGANIZED HEALTH CARE EDUCATION/TRAINING PROGRAM

## 2024-10-29 PROCEDURE — 23472 RECONSTRUCT SHOULDER JOINT: CPT

## 2024-10-29 PROCEDURE — 2500000004 HC RX 250 GENERAL PHARMACY W/ HCPCS (ALT 636 FOR OP/ED)

## 2024-10-29 PROCEDURE — C1769 GUIDE WIRE: HCPCS | Performed by: STUDENT IN AN ORGANIZED HEALTH CARE EDUCATION/TRAINING PROGRAM

## 2024-10-29 PROCEDURE — 2500000005 HC RX 250 GENERAL PHARMACY W/O HCPCS

## 2024-10-29 PROCEDURE — 2780000003 HC OR 278 NO HCPCS: Performed by: STUDENT IN AN ORGANIZED HEALTH CARE EDUCATION/TRAINING PROGRAM

## 2024-10-29 PROCEDURE — 2500000004 HC RX 250 GENERAL PHARMACY W/ HCPCS (ALT 636 FOR OP/ED): Performed by: STUDENT IN AN ORGANIZED HEALTH CARE EDUCATION/TRAINING PROGRAM

## 2024-10-29 PROCEDURE — 7100000002 HC RECOVERY ROOM TIME - EACH INCREMENTAL 1 MINUTE: Performed by: STUDENT IN AN ORGANIZED HEALTH CARE EDUCATION/TRAINING PROGRAM

## 2024-10-29 PROCEDURE — 2500000001 HC RX 250 WO HCPCS SELF ADMINISTERED DRUGS (ALT 637 FOR MEDICARE OP)

## 2024-10-29 PROCEDURE — 73030 X-RAY EXAM OF SHOULDER: CPT | Mod: RIGHT SIDE | Performed by: RADIOLOGY

## 2024-10-29 PROCEDURE — 3700000002 HC GENERAL ANESTHESIA TIME - EACH INCREMENTAL 1 MINUTE: Performed by: STUDENT IN AN ORGANIZED HEALTH CARE EDUCATION/TRAINING PROGRAM

## 2024-10-29 PROCEDURE — 3700000001 HC GENERAL ANESTHESIA TIME - INITIAL BASE CHARGE: Performed by: STUDENT IN AN ORGANIZED HEALTH CARE EDUCATION/TRAINING PROGRAM

## 2024-10-29 PROCEDURE — 99100 ANES PT EXTEME AGE<1 YR&>70: CPT | Performed by: ANESTHESIOLOGY

## 2024-10-29 PROCEDURE — 7100000001 HC RECOVERY ROOM TIME - INITIAL BASE CHARGE: Performed by: STUDENT IN AN ORGANIZED HEALTH CARE EDUCATION/TRAINING PROGRAM

## 2024-10-29 PROCEDURE — 23472 RECONSTRUCT SHOULDER JOINT: CPT | Performed by: STUDENT IN AN ORGANIZED HEALTH CARE EDUCATION/TRAINING PROGRAM

## 2024-10-29 PROCEDURE — 2720000007 HC OR 272 NO HCPCS: Performed by: STUDENT IN AN ORGANIZED HEALTH CARE EDUCATION/TRAINING PROGRAM

## 2024-10-29 PROCEDURE — 2500000004 HC RX 250 GENERAL PHARMACY W/ HCPCS (ALT 636 FOR OP/ED): Performed by: ANESTHESIOLOGY

## 2024-10-29 PROCEDURE — A23472 PR RECONSTR TOTAL SHOULDER IMPLANT: Performed by: INTERNAL MEDICINE

## 2024-10-29 PROCEDURE — 73030 X-RAY EXAM OF SHOULDER: CPT | Mod: RT

## 2024-10-29 PROCEDURE — C1781 MESH (IMPLANTABLE): HCPCS | Performed by: STUDENT IN AN ORGANIZED HEALTH CARE EDUCATION/TRAINING PROGRAM

## 2024-10-29 PROCEDURE — 3600000010 HC OR TIME - EACH INCREMENTAL 1 MINUTE - PROCEDURE LEVEL FIVE: Performed by: STUDENT IN AN ORGANIZED HEALTH CARE EDUCATION/TRAINING PROGRAM

## 2024-10-29 PROCEDURE — 7100000009 HC PHASE TWO TIME - INITIAL BASE CHARGE: Performed by: STUDENT IN AN ORGANIZED HEALTH CARE EDUCATION/TRAINING PROGRAM

## 2024-10-29 PROCEDURE — C1776 JOINT DEVICE (IMPLANTABLE): HCPCS | Performed by: STUDENT IN AN ORGANIZED HEALTH CARE EDUCATION/TRAINING PROGRAM

## 2024-10-29 DEVICE — SCREW, PERFORM REVERSED PERIPHERAL, 5.0 X 18MM: Type: IMPLANTABLE DEVICE | Site: SHOULDER | Status: FUNCTIONAL

## 2024-10-29 DEVICE — SCREW, PERFORM REVERSED PERIPHERAL, 5.0 X 26MM: Type: IMPLANTABLE DEVICE | Site: SHOULDER | Status: FUNCTIONAL

## 2024-10-29 DEVICE — SCREW, PERFORM REVERSED PERIPHERAL, 5.0 X 14MM: Type: IMPLANTABLE DEVICE | Site: SHOULDER | Status: FUNCTIONAL

## 2024-10-29 DEVICE — IMPLANTABLE DEVICE
Type: IMPLANTABLE DEVICE | Site: SHOULDER | Status: FUNCTIONAL
Brand: FLEX SHOULDER SYSTEM

## 2024-10-29 DEVICE — GUIDE PIN, 3 X 75MM, STERILE: Type: IMPLANTABLE DEVICE | Site: SHOULDER | Status: FUNCTIONAL

## 2024-10-29 DEVICE — SCREW, PERFORM REVERSED CENTRAL, 6.5 X 35MM: Type: IMPLANTABLE DEVICE | Site: SHOULDER | Status: FUNCTIONAL

## 2024-10-29 DEVICE — IMPLANTABLE DEVICE
Type: IMPLANTABLE DEVICE | Site: SHOULDER | Status: FUNCTIONAL
Brand: AEQUALIS™ PERFORM REVERSED

## 2024-10-29 RX ORDER — NEOSTIGMINE METHYLSULFATE 1 MG/ML
INJECTION INTRAVENOUS AS NEEDED
Status: DISCONTINUED | OUTPATIENT
Start: 2024-10-29 | End: 2024-10-29

## 2024-10-29 RX ORDER — GLYCOPYRROLATE 0.2 MG/ML
INJECTION INTRAMUSCULAR; INTRAVENOUS AS NEEDED
Status: DISCONTINUED | OUTPATIENT
Start: 2024-10-29 | End: 2024-10-29

## 2024-10-29 RX ORDER — OXYCODONE HYDROCHLORIDE 5 MG/1
5 TABLET ORAL EVERY 6 HOURS PRN
Qty: 15 TABLET | Refills: 0 | Status: SHIPPED | OUTPATIENT
Start: 2024-10-29 | End: 2024-11-02

## 2024-10-29 RX ORDER — LIDOCAINE HYDROCHLORIDE 10 MG/ML
0.1 INJECTION, SOLUTION EPIDURAL; INFILTRATION; INTRACAUDAL; PERINEURAL ONCE
Status: DISCONTINUED | OUTPATIENT
Start: 2024-10-29 | End: 2024-10-29 | Stop reason: HOSPADM

## 2024-10-29 RX ORDER — ROCURONIUM BROMIDE 10 MG/ML
INJECTION, SOLUTION INTRAVENOUS AS NEEDED
Status: DISCONTINUED | OUTPATIENT
Start: 2024-10-29 | End: 2024-10-29

## 2024-10-29 RX ORDER — FENTANYL CITRATE 50 UG/ML
INJECTION, SOLUTION INTRAMUSCULAR; INTRAVENOUS AS NEEDED
Status: DISCONTINUED | OUTPATIENT
Start: 2024-10-29 | End: 2024-10-29

## 2024-10-29 RX ORDER — NORETHINDRONE AND ETHINYL ESTRADIOL 0.5-0.035
KIT ORAL AS NEEDED
Status: DISCONTINUED | OUTPATIENT
Start: 2024-10-29 | End: 2024-10-29

## 2024-10-29 RX ORDER — FENTANYL CITRATE 50 UG/ML
25 INJECTION, SOLUTION INTRAMUSCULAR; INTRAVENOUS EVERY 5 MIN PRN
Status: DISCONTINUED | OUTPATIENT
Start: 2024-10-29 | End: 2024-10-29 | Stop reason: HOSPADM

## 2024-10-29 RX ORDER — FENTANYL CITRATE 50 UG/ML
50 INJECTION, SOLUTION INTRAMUSCULAR; INTRAVENOUS ONCE
Status: COMPLETED | OUTPATIENT
Start: 2024-10-29 | End: 2024-10-29

## 2024-10-29 RX ORDER — MEPERIDINE HYDROCHLORIDE 25 MG/ML
12.5 INJECTION INTRAMUSCULAR; INTRAVENOUS; SUBCUTANEOUS EVERY 10 MIN PRN
Status: DISCONTINUED | OUTPATIENT
Start: 2024-10-29 | End: 2024-10-29 | Stop reason: HOSPADM

## 2024-10-29 RX ORDER — VANCOMYCIN HYDROCHLORIDE 1 G/20ML
INJECTION, POWDER, LYOPHILIZED, FOR SOLUTION INTRAVENOUS AS NEEDED
Status: DISCONTINUED | OUTPATIENT
Start: 2024-10-29 | End: 2024-10-29 | Stop reason: HOSPADM

## 2024-10-29 RX ORDER — LIDOCAINE HYDROCHLORIDE 10 MG/ML
INJECTION, SOLUTION EPIDURAL; INFILTRATION; INTRACAUDAL; PERINEURAL AS NEEDED
Status: DISCONTINUED | OUTPATIENT
Start: 2024-10-29 | End: 2024-10-29

## 2024-10-29 RX ORDER — MIDAZOLAM HYDROCHLORIDE 1 MG/ML
INJECTION, SOLUTION INTRAMUSCULAR; INTRAVENOUS AS NEEDED
Status: DISCONTINUED | OUTPATIENT
Start: 2024-10-29 | End: 2024-10-29

## 2024-10-29 RX ORDER — ACETAMINOPHEN 500 MG
1000 TABLET ORAL EVERY 8 HOURS PRN
Qty: 60 TABLET | Refills: 1 | Status: SHIPPED | OUTPATIENT
Start: 2024-10-29 | End: 2024-11-18

## 2024-10-29 RX ORDER — CELECOXIB 200 MG/1
200 CAPSULE ORAL ONCE
Status: COMPLETED | OUTPATIENT
Start: 2024-10-29 | End: 2024-10-29

## 2024-10-29 RX ORDER — DOXYCYCLINE 100 MG/1
100 CAPSULE ORAL 2 TIMES DAILY
Qty: 14 CAPSULE | Refills: 0 | Status: SHIPPED | OUTPATIENT
Start: 2024-10-29 | End: 2024-11-05

## 2024-10-29 RX ORDER — LABETALOL HYDROCHLORIDE 5 MG/ML
5 INJECTION, SOLUTION INTRAVENOUS ONCE AS NEEDED
Status: DISCONTINUED | OUTPATIENT
Start: 2024-10-29 | End: 2024-10-29 | Stop reason: HOSPADM

## 2024-10-29 RX ORDER — TRANEXAMIC ACID 100 MG/ML
INJECTION, SOLUTION INTRAVENOUS AS NEEDED
Status: DISCONTINUED | OUTPATIENT
Start: 2024-10-29 | End: 2024-10-29

## 2024-10-29 RX ORDER — ONDANSETRON HYDROCHLORIDE 2 MG/ML
4 INJECTION, SOLUTION INTRAVENOUS ONCE AS NEEDED
Status: DISCONTINUED | OUTPATIENT
Start: 2024-10-29 | End: 2024-10-29 | Stop reason: HOSPADM

## 2024-10-29 RX ORDER — OXYCODONE HYDROCHLORIDE 5 MG/1
5 TABLET ORAL EVERY 4 HOURS PRN
Status: DISCONTINUED | OUTPATIENT
Start: 2024-10-29 | End: 2024-10-29 | Stop reason: HOSPADM

## 2024-10-29 RX ORDER — CEFAZOLIN 1 G/1
INJECTION, POWDER, FOR SOLUTION INTRAVENOUS AS NEEDED
Status: DISCONTINUED | OUTPATIENT
Start: 2024-10-29 | End: 2024-10-29

## 2024-10-29 RX ORDER — ONDANSETRON HYDROCHLORIDE 2 MG/ML
INJECTION, SOLUTION INTRAVENOUS AS NEEDED
Status: DISCONTINUED | OUTPATIENT
Start: 2024-10-29 | End: 2024-10-29

## 2024-10-29 RX ORDER — GABAPENTIN 300 MG/1
300 CAPSULE ORAL ONCE
Status: COMPLETED | OUTPATIENT
Start: 2024-10-29 | End: 2024-10-29

## 2024-10-29 RX ORDER — CEFAZOLIN SODIUM 2 G/100ML
2 INJECTION, SOLUTION INTRAVENOUS ONCE
Status: DISCONTINUED | OUTPATIENT
Start: 2024-10-29 | End: 2024-10-29 | Stop reason: HOSPADM

## 2024-10-29 RX ORDER — MIDAZOLAM HYDROCHLORIDE 1 MG/ML
2 INJECTION, SOLUTION INTRAMUSCULAR; INTRAVENOUS ONCE
Status: COMPLETED | OUTPATIENT
Start: 2024-10-29 | End: 2024-10-29

## 2024-10-29 RX ORDER — PHENYLEPHRINE HCL IN 0.9% NACL 0.4MG/10ML
SYRINGE (ML) INTRAVENOUS AS NEEDED
Status: DISCONTINUED | OUTPATIENT
Start: 2024-10-29 | End: 2024-10-29

## 2024-10-29 RX ORDER — OXYCODONE HYDROCHLORIDE 5 MG/1
5 TABLET ORAL EVERY 4 HOURS PRN
Qty: 15 TABLET | Refills: 0 | Status: SHIPPED | OUTPATIENT
Start: 2024-10-29 | End: 2024-10-29

## 2024-10-29 RX ORDER — OXYCODONE HYDROCHLORIDE 5 MG/1
5 TABLET ORAL EVERY 6 HOURS PRN
Qty: 15 TABLET | Refills: 0 | Status: SHIPPED | OUTPATIENT
Start: 2024-10-29 | End: 2024-11-01

## 2024-10-29 RX ORDER — ACETAMINOPHEN 325 MG/1
975 TABLET ORAL ONCE
Status: COMPLETED | OUTPATIENT
Start: 2024-10-29 | End: 2024-10-29

## 2024-10-29 RX ORDER — ASPIRIN 81 MG/1
81 TABLET ORAL 2 TIMES DAILY
Qty: 30 TABLET | Refills: 0 | Status: SHIPPED | OUTPATIENT
Start: 2024-10-29 | End: 2024-11-13

## 2024-10-29 RX ORDER — PROPOFOL 10 MG/ML
INJECTION, EMULSION INTRAVENOUS AS NEEDED
Status: DISCONTINUED | OUTPATIENT
Start: 2024-10-29 | End: 2024-10-29

## 2024-10-29 RX ORDER — ONDANSETRON 4 MG/1
4 TABLET, FILM COATED ORAL EVERY 8 HOURS PRN
Qty: 20 TABLET | Refills: 0 | Status: SHIPPED | OUTPATIENT
Start: 2024-10-29 | End: 2024-11-05

## 2024-10-29 RX ORDER — OXYCODONE HYDROCHLORIDE 5 MG/1
5 TABLET ORAL EVERY 4 HOURS PRN
Qty: 20 TABLET | Refills: 0 | Status: SHIPPED | OUTPATIENT
Start: 2024-10-29 | End: 2024-10-29

## 2024-10-29 RX ORDER — FENTANYL CITRATE 50 UG/ML
50 INJECTION, SOLUTION INTRAMUSCULAR; INTRAVENOUS EVERY 5 MIN PRN
Status: DISCONTINUED | OUTPATIENT
Start: 2024-10-29 | End: 2024-10-29 | Stop reason: HOSPADM

## 2024-10-29 ASSESSMENT — PAIN - FUNCTIONAL ASSESSMENT
PAIN_FUNCTIONAL_ASSESSMENT: 0-10
PAIN_FUNCTIONAL_ASSESSMENT: 0-10
PAIN_FUNCTIONAL_ASSESSMENT: FLACC (FACE, LEGS, ACTIVITY, CRY, CONSOLABILITY)
PAIN_FUNCTIONAL_ASSESSMENT: 0-10

## 2024-10-29 ASSESSMENT — PAIN SCALES - GENERAL
PAINLEVEL_OUTOF10: 0 - NO PAIN
PAINLEVEL_OUTOF10: 5 - MODERATE PAIN
PAINLEVEL_OUTOF10: 5 - MODERATE PAIN
PAINLEVEL_OUTOF10: 0 - NO PAIN

## 2024-10-29 ASSESSMENT — PAIN DESCRIPTION - DESCRIPTORS: DESCRIPTORS: ACHING;SORE

## 2024-10-29 ASSESSMENT — COLUMBIA-SUICIDE SEVERITY RATING SCALE - C-SSRS
6. HAVE YOU EVER DONE ANYTHING, STARTED TO DO ANYTHING, OR PREPARED TO DO ANYTHING TO END YOUR LIFE?: NO
1. IN THE PAST MONTH, HAVE YOU WISHED YOU WERE DEAD OR WISHED YOU COULD GO TO SLEEP AND NOT WAKE UP?: NO
2. HAVE YOU ACTUALLY HAD ANY THOUGHTS OF KILLING YOURSELF?: NO

## 2024-10-31 ASSESSMENT — PAIN SCALES - GENERAL: PAINLEVEL_OUTOF10: 1

## 2024-11-04 ENCOUNTER — APPOINTMENT (OUTPATIENT)
Dept: ORTHOPEDIC SURGERY | Facility: CLINIC | Age: 69
End: 2024-11-04
Payer: MEDICARE

## 2024-11-11 ENCOUNTER — APPOINTMENT (OUTPATIENT)
Dept: ORTHOPEDIC SURGERY | Facility: CLINIC | Age: 69
End: 2024-11-11
Payer: MEDICARE

## 2024-11-11 ENCOUNTER — HOSPITAL ENCOUNTER (OUTPATIENT)
Dept: RADIOLOGY | Facility: CLINIC | Age: 69
Discharge: HOME | End: 2024-11-11
Payer: MEDICARE

## 2024-11-11 VITALS — WEIGHT: 174.16 LBS | BODY MASS INDEX: 27.34 KG/M2 | HEIGHT: 67 IN

## 2024-11-11 DIAGNOSIS — Z96.611 STATUS POST REVERSE ARTHROPLASTY OF RIGHT SHOULDER: ICD-10-CM

## 2024-11-11 PROCEDURE — 3008F BODY MASS INDEX DOCD: CPT | Performed by: STUDENT IN AN ORGANIZED HEALTH CARE EDUCATION/TRAINING PROGRAM

## 2024-11-11 PROCEDURE — 1123F ACP DISCUSS/DSCN MKR DOCD: CPT | Performed by: STUDENT IN AN ORGANIZED HEALTH CARE EDUCATION/TRAINING PROGRAM

## 2024-11-11 PROCEDURE — 73030 X-RAY EXAM OF SHOULDER: CPT | Mod: RT

## 2024-11-11 PROCEDURE — 1160F RVW MEDS BY RX/DR IN RCRD: CPT | Performed by: STUDENT IN AN ORGANIZED HEALTH CARE EDUCATION/TRAINING PROGRAM

## 2024-11-11 PROCEDURE — 1036F TOBACCO NON-USER: CPT | Performed by: STUDENT IN AN ORGANIZED HEALTH CARE EDUCATION/TRAINING PROGRAM

## 2024-11-11 PROCEDURE — 99024 POSTOP FOLLOW-UP VISIT: CPT | Performed by: STUDENT IN AN ORGANIZED HEALTH CARE EDUCATION/TRAINING PROGRAM

## 2024-11-11 PROCEDURE — 73030 X-RAY EXAM OF SHOULDER: CPT | Mod: RIGHT SIDE | Performed by: RADIOLOGY

## 2024-11-11 PROCEDURE — 1159F MED LIST DOCD IN RCRD: CPT | Performed by: STUDENT IN AN ORGANIZED HEALTH CARE EDUCATION/TRAINING PROGRAM

## 2024-11-11 ASSESSMENT — PAIN - FUNCTIONAL ASSESSMENT: PAIN_FUNCTIONAL_ASSESSMENT: NO/DENIES PAIN

## 2024-11-11 NOTE — PROGRESS NOTES
PRIMARY CARE PHYSICIAN: AUSTYN Davidson-[facility]    ORTHOPAEDIC POSTOPERATIVE VISIT    ASSESSMENT & PLAN    Impression: 69 y.o. male 13 days postop s/p Right reverse total shoulder arthroplasty, biceps tenodesis done 10/29/2024.    Plan:   I reviewed his x-ray findings with him.  He is doing quite well.  He will continue with his sling for 1 more week.  He understands his postoperative precautions me that may put the shoulder at risk of dislocation.  He will begin working with physical therapy through the postoperative protocol for the above which he was provided today.    I reviewed the intraoperative findings with the patient and answered all their questions. I reviewed their postoperative timeline and plan with them. They understand the postoperative precautions and the treatment plan going forward.     Follow-Up: Patient will follow-up 4 weeks with x-rays right shoulder    At the end of the visit, all questions were answered in full. The patient is in agreement with the plan and recommendations. They will call the office with any questions/concerns.      Note dictated with [id number] software. Completed without full typed error editing and sent to avoid delay.      SUBJECTIVE  CHIEF COMPLAINT:   Chief Complaint   Patient presents with    Right Shoulder - Post-op        HPI: Alen Cobian is a 69 y.o. patient who is now 13 days postop s/p Right reverse total shoulder arthroplasty, biceps tenodesis done 10/29/2024. Pain is well controlled. Overall he is doing well. He has no concerns. He is currently immobilized in a sling. XR done today.  He notes that he only took pain medications for a few days after surgery and has since only been taking Tylenol.    REVIEW OF SYSTEMS  Constitutional: See HPI for pain assessment, No significant weight loss, recent trauma  Cardiovascular: No chest pain, shortness of breath  Respiratory: No difficulty breathing, cough  Gastrointestinal: No nausea, vomiting,  diarrhea, constipation  Musculoskeletal: Noted in HPI, positive for pain, restricted motion, stiffness  Integumentary: No rashes, easy bruising, redness   Neurological: no numbness or tingling in extremities, no gait disturbances   Psychiatric: No mood changes, memory changes, social issues  Heme/Lymph: no excessive swelling, easy bruising, excessive bleeding  ENT: No hearing changes  Eyes: No vision changes    CURRENT MEDICATIONS:   Current Outpatient Medications   Medication Sig Dispense Refill    acetaminophen (Tylenol) 500 mg tablet Take 2 tablets (1,000 mg) by mouth every 8 hours if needed for mild pain (1 - 3) for up to 20 days. 60 tablet 1    amLODIPine (Norvasc) 2.5 mg tablet Take 1 tablet (2.5 mg) by mouth once daily. 90 tablet 3    aspirin 81 mg EC tablet Take 1 tablet (81 mg) by mouth 2 times a day for 15 days. 30 tablet 0    chlorhexidine (Peridex) 0.12 % solution Swish and spit 15 mL night before surgery and morning of surgery 473 mL 0    chlorthalidone (Hygroton) 25 mg tablet Take 1 tablet (25 mg) by mouth once daily. 90 tablet 3    gabapentin (Neurontin) 300 mg capsule Take 1 capsule (300 mg) by mouth 2 times a day. 180 capsule 3    ibuprofen 200 mg tablet Take by mouth every 6 hours if needed for mild pain (1 - 3).      lisinopril 20 mg tablet Take 1 tablet (20 mg) by mouth 2 times a day. as directed 180 tablet 3     No current facility-administered medications for this visit.        OBJECTIVE    PHYSICAL EXAM      10/29/2024    11:20 AM 10/29/2024    11:30 AM 10/29/2024    11:45 AM 10/29/2024     1:46 PM 10/29/2024     2:00 PM 10/29/2024     2:13 PM 10/29/2024     2:30 PM   Vitals   Systolic 114 110 111 122 133 144 140   Diastolic 72 71 70 73 79 88 80   Heart Rate 61 64 62 81 89 95 85   Temp    36 °C (96.8 °F)  36.3 °C (97.3 °F) 36.4 °C (97.5 °F)   Resp 12 12 14 14 16 18 16      There is no height or weight on file to calculate BMI.    General: Well-appearing, no acute distress    Skin intact  bilateral upper and lower extremities  No erythema  No warmth    Detailed examination of the right shoulder demonstrates:  Surgical incision healing well  No erythema or warmth  No drainage  No ecchymosis  Resolving swelling, minimal  Biceps contour normal  Shoulder range of motion deferred  Upper extremity motor grossly intact  C5-T1 sensation intact bilaterally  2+ radial pulses bilaterally  Warm and well-perfused, brisk capillary refill    IMAGING:   X-ray views of the right shoulder demonstrate a reverse total shoulder arthroplasty in place without evidence of hardware complication, implant loosening or dislocation      José Antonio Wright MD  Attending Surgeon    Sports Medicine Orthopaedic Surgery  [facility] [place]  [employer]

## 2024-12-04 ENCOUNTER — APPOINTMENT (OUTPATIENT)
Dept: PRIMARY CARE | Facility: CLINIC | Age: 69
End: 2024-12-04
Payer: MEDICARE

## 2024-12-09 ENCOUNTER — APPOINTMENT (OUTPATIENT)
Dept: ORTHOPEDIC SURGERY | Facility: CLINIC | Age: 69
End: 2024-12-09
Payer: MEDICARE

## 2024-12-09 ENCOUNTER — HOSPITAL ENCOUNTER (OUTPATIENT)
Dept: RADIOLOGY | Facility: CLINIC | Age: 69
Discharge: HOME | End: 2024-12-09
Payer: MEDICARE

## 2024-12-09 VITALS — BODY MASS INDEX: 27.47 KG/M2 | WEIGHT: 175 LBS | HEIGHT: 67 IN

## 2024-12-09 DIAGNOSIS — Z96.611 STATUS POST REVERSE ARTHROPLASTY OF RIGHT SHOULDER: ICD-10-CM

## 2024-12-09 PROCEDURE — 73030 X-RAY EXAM OF SHOULDER: CPT | Mod: RIGHT SIDE | Performed by: RADIOLOGY

## 2024-12-09 PROCEDURE — 1036F TOBACCO NON-USER: CPT

## 2024-12-09 PROCEDURE — 73030 X-RAY EXAM OF SHOULDER: CPT | Mod: RT

## 2024-12-09 PROCEDURE — 99024 POSTOP FOLLOW-UP VISIT: CPT

## 2024-12-09 PROCEDURE — 1160F RVW MEDS BY RX/DR IN RCRD: CPT

## 2024-12-09 PROCEDURE — 3008F BODY MASS INDEX DOCD: CPT

## 2024-12-09 PROCEDURE — 1123F ACP DISCUSS/DSCN MKR DOCD: CPT

## 2024-12-09 PROCEDURE — 1159F MED LIST DOCD IN RCRD: CPT

## 2024-12-09 ASSESSMENT — PAIN - FUNCTIONAL ASSESSMENT: PAIN_FUNCTIONAL_ASSESSMENT: NO/DENIES PAIN

## 2024-12-09 NOTE — PROGRESS NOTES
PRIMARY CARE PHYSICIAN: AUSTYN Davidson-[facility]    ORTHOPAEDIC POSTOPERATIVE VISIT    ASSESSMENT & PLAN    Impression: 69 y.o. male 6 weeks postop s/p Right reverse total shoulder arthroplasty, biceps tenodesis done 10/29/2024.    Plan:   Overall Alen is doing very well.  He has minimal to no pain.  He has discontinued with his sling and is progressing well with physical therapy.  He will continue to work with physical therapy through the postoperative protocol for the above.  He understands his postoperative precautions and the positions that may put the shoulder at risk of dislocation.  He will ice and rest the shoulder as needed and follow-up with us in 6 weeks.    I reviewed their postoperative timeline and plan with them. They understand the postoperative precautions and the treatment plan going forward.     Follow-Up: Patient will follow-up 6 weeks with x-rays right shoulder    At the end of the visit, all questions were answered in full. The patient is in agreement with the plan and recommendations. They will call the office with any questions/concerns.      Note dictated with [employer] software. Completed without full typed error editing and sent to avoid delay.      SUBJECTIVE  CHIEF COMPLAINT:   Chief Complaint   Patient presents with    Right Shoulder - Post-op        HPI: Alen Cobian is a 69 y.o. patient who is now 6 weeks postop s/p Right reverse total shoulder arthroplasty, biceps tenodesis done 10/29/2024. Overall, he is doing well. Pain is well controlled. He is doing at home PT with his son who is a physical therapist. Tylenol PRN. He has no concerns. XR done today.    REVIEW OF SYSTEMS  Constitutional: See HPI for pain assessment, No significant weight loss, recent trauma  Cardiovascular: No chest pain, shortness of breath  Respiratory: No difficulty breathing, cough  Gastrointestinal: No nausea, vomiting, diarrhea, constipation  Musculoskeletal: Noted in HPI, positive for  "pain, restricted motion, stiffness  Integumentary: No rashes, easy bruising, redness   Neurological: no numbness or tingling in extremities, no gait disturbances   Psychiatric: No mood changes, memory changes, social issues  Heme/Lymph: no excessive swelling, easy bruising, excessive bleeding  ENT: No hearing changes  Eyes: No vision changes    CURRENT MEDICATIONS:   Current Outpatient Medications   Medication Sig Dispense Refill    amLODIPine (Norvasc) 2.5 mg tablet Take 1 tablet (2.5 mg) by mouth once daily. 90 tablet 3    chlorhexidine (Peridex) 0.12 % solution Swish and spit 15 mL night before surgery and morning of surgery 473 mL 0    chlorthalidone (Hygroton) 25 mg tablet Take 1 tablet (25 mg) by mouth once daily. 90 tablet 3    gabapentin (Neurontin) 300 mg capsule Take 1 capsule (300 mg) by mouth 2 times a day. 180 capsule 3    ibuprofen 200 mg tablet Take by mouth every 6 hours if needed for mild pain (1 - 3).      lisinopril 20 mg tablet Take 1 tablet (20 mg) by mouth 2 times a day. as directed 180 tablet 3     No current facility-administered medications for this visit.        OBJECTIVE    PHYSICAL EXAM      10/29/2024    11:30 AM 10/29/2024    11:45 AM 10/29/2024     1:46 PM 10/29/2024     2:00 PM 10/29/2024     2:13 PM 10/29/2024     2:30 PM 11/11/2024     1:32 PM   Vitals   Systolic 110 111 122 133 144 140    Diastolic 71 70 73 79 88 80    Heart Rate 64 62 81 89 95 85    Temp   36 °C (96.8 °F)  36.3 °C (97.3 °F) 36.4 °C (97.5 °F)    Resp 12 14 14 16 18 16    Height       1.702 m (5' 7.01\")   Weight (lb)       174.16   BMI       27.27 kg/m2   BSA (m2)       1.93 m2   Visit Report       Report      There is no height or weight on file to calculate BMI.    General: Well-appearing, no acute distress    Skin intact bilateral upper and lower extremities  No erythema  No warmth    Detailed examination of the right shoulder demonstrates:  Surgical incision well-healed  No erythema or warmth  No " ecchymosis  Resolving swelling, minimal  Biceps contour normal  Shoulder range of motion: Touching top of head  Upper extremity motor grossly intact  C5-T1 sensation intact bilaterally  2+ radial pulses bilaterally  Warm and well-perfused, brisk capillary refill    IMAGING:   X-ray views of the right shoulder demonstrate a reverse total shoulder arthroplasty in place without evidence of hardware complication, implant loosening or dislocation

## 2024-12-17 ENCOUNTER — APPOINTMENT (OUTPATIENT)
Dept: PRIMARY CARE | Facility: CLINIC | Age: 69
End: 2024-12-17
Payer: MEDICARE

## 2024-12-17 ENCOUNTER — OFFICE VISIT (OUTPATIENT)
Dept: PRIMARY CARE | Facility: CLINIC | Age: 69
End: 2024-12-17
Payer: MEDICARE

## 2024-12-17 VITALS
BODY MASS INDEX: 27.56 KG/M2 | SYSTOLIC BLOOD PRESSURE: 118 MMHG | WEIGHT: 175.6 LBS | HEIGHT: 67 IN | OXYGEN SATURATION: 96 % | DIASTOLIC BLOOD PRESSURE: 64 MMHG | HEART RATE: 94 BPM

## 2024-12-17 DIAGNOSIS — R73.03 PREDIABETES: ICD-10-CM

## 2024-12-17 DIAGNOSIS — Z00.00 MEDICARE ANNUAL WELLNESS VISIT, SUBSEQUENT: ICD-10-CM

## 2024-12-17 DIAGNOSIS — E55.9 VITAMIN D DEFICIENCY: ICD-10-CM

## 2024-12-17 DIAGNOSIS — R20.2 NUMBNESS AND TINGLING IN BOTH HANDS: ICD-10-CM

## 2024-12-17 DIAGNOSIS — R20.0 NUMBNESS AND TINGLING IN BOTH HANDS: ICD-10-CM

## 2024-12-17 DIAGNOSIS — E53.8 VITAMIN B12 DEFICIENCY: ICD-10-CM

## 2024-12-17 DIAGNOSIS — E53.8 VITAMIN B 12 DEFICIENCY: ICD-10-CM

## 2024-12-17 DIAGNOSIS — Z12.5 PROSTATE CANCER SCREENING: Primary | ICD-10-CM

## 2024-12-17 DIAGNOSIS — Z00.00 HEALTHCARE MAINTENANCE: ICD-10-CM

## 2024-12-17 DIAGNOSIS — I10 BENIGN ESSENTIAL HYPERTENSION: ICD-10-CM

## 2024-12-17 DIAGNOSIS — I10 BENIGN ESSENTIAL HYPERTENSION: Primary | ICD-10-CM

## 2024-12-17 DIAGNOSIS — E78.5 DYSLIPIDEMIA: ICD-10-CM

## 2024-12-17 DIAGNOSIS — I10 PRIMARY HYPERTENSION: ICD-10-CM

## 2024-12-17 DIAGNOSIS — I77.819 AORTIC DILATATION (CMS-HCC): ICD-10-CM

## 2024-12-17 PROCEDURE — G0444 DEPRESSION SCREEN ANNUAL: HCPCS | Performed by: CLINICAL NURSE SPECIALIST

## 2024-12-17 PROCEDURE — 3008F BODY MASS INDEX DOCD: CPT | Performed by: CLINICAL NURSE SPECIALIST

## 2024-12-17 PROCEDURE — 1159F MED LIST DOCD IN RCRD: CPT | Performed by: CLINICAL NURSE SPECIALIST

## 2024-12-17 PROCEDURE — 1160F RVW MEDS BY RX/DR IN RCRD: CPT | Performed by: CLINICAL NURSE SPECIALIST

## 2024-12-17 PROCEDURE — 3078F DIAST BP <80 MM HG: CPT | Performed by: CLINICAL NURSE SPECIALIST

## 2024-12-17 PROCEDURE — G0439 PPPS, SUBSEQ VISIT: HCPCS | Performed by: CLINICAL NURSE SPECIALIST

## 2024-12-17 PROCEDURE — 1036F TOBACCO NON-USER: CPT | Performed by: CLINICAL NURSE SPECIALIST

## 2024-12-17 PROCEDURE — 99214 OFFICE O/P EST MOD 30 MIN: CPT | Performed by: CLINICAL NURSE SPECIALIST

## 2024-12-17 PROCEDURE — 1170F FXNL STATUS ASSESSED: CPT | Performed by: CLINICAL NURSE SPECIALIST

## 2024-12-17 PROCEDURE — 3074F SYST BP LT 130 MM HG: CPT | Performed by: CLINICAL NURSE SPECIALIST

## 2024-12-17 PROCEDURE — 1124F ACP DISCUSS-NO DSCNMKR DOCD: CPT | Performed by: CLINICAL NURSE SPECIALIST

## 2024-12-17 RX ORDER — ACETAMINOPHEN 500 MG
500 TABLET ORAL 2 TIMES DAILY PRN
COMMUNITY

## 2024-12-17 ASSESSMENT — ENCOUNTER SYMPTOMS
TROUBLE SWALLOWING: 0
NAUSEA: 0
HEADACHES: 0
SHORTNESS OF BREATH: 0
DIARRHEA: 0
FLANK PAIN: 0
FATIGUE: 0
CHILLS: 0
JOINT SWELLING: 0
LOSS OF SENSATION IN FEET: 0
UNEXPECTED WEIGHT CHANGE: 0
NECK PAIN: 0
WOUND: 0
FEVER: 0
MYALGIAS: 0
POLYDIPSIA: 0
HEMATURIA: 0
WHEEZING: 0
ABDOMINAL PAIN: 0
SORE THROAT: 0
BACK PAIN: 0
ACTIVITY CHANGE: 0
APPETITE CHANGE: 0
BRUISES/BLEEDS EASILY: 0
EYE PAIN: 0
CHEST TIGHTNESS: 0
ARTHRALGIAS: 0
SEIZURES: 0
SLEEP DISTURBANCE: 0
DEPRESSION: 0
BLOOD IN STOOL: 0
CONSTIPATION: 0
PHOTOPHOBIA: 0
DIZZINESS: 0
COUGH: 0
PALPITATIONS: 0
OCCASIONAL FEELINGS OF UNSTEADINESS: 0
CONFUSION: 0
VOMITING: 0
DYSURIA: 0

## 2024-12-17 ASSESSMENT — ACTIVITIES OF DAILY LIVING (ADL)
BATHING: INDEPENDENT
DOING_HOUSEWORK: INDEPENDENT
DRESSING: INDEPENDENT
TAKING_MEDICATION: INDEPENDENT
MANAGING_FINANCES: INDEPENDENT
GROCERY_SHOPPING: INDEPENDENT

## 2024-12-17 ASSESSMENT — PATIENT HEALTH QUESTIONNAIRE - PHQ9
2. FEELING DOWN, DEPRESSED OR HOPELESS: NOT AT ALL
1. LITTLE INTEREST OR PLEASURE IN DOING THINGS: NOT AT ALL
SUM OF ALL RESPONSES TO PHQ9 QUESTIONS 1 AND 2: 0

## 2024-12-17 NOTE — ASSESSMENT & PLAN NOTE
Orders:    Follow Up In [facility] - PCP - Medicare Annual    Follow Up In [facility] - PCP - Established; Future

## 2024-12-17 NOTE — PROGRESS NOTES
Subjective   Reason for Visit: Alen Cobian is an 69 y.o. male here for a Medicare Wellness visit.     Past Medical, Surgical, and Family History reviewed and updated in chart.    Reviewed all medications by prescribing practitioner or clinical pharmacist (such as prescriptions, OTCs, herbal therapies and supplements) and documented in the medical record.    HPI    Here today as a follow up appointment. Due for Medicare Wellness.      Following with Cardiology, annually. Aortic dilation. Monitoring ECHO every two years. Planning to have done Summer of 2025.      Has been taking Gabapentin, using for the past few years. Has been taking for Neuropathy. Carpal Tunnel Surgery August 2023.     Followed with Orthopedics, shoulder Surgery in October. Progressing with PT. Recent follow up, doing well. Pain controlled. Using Tylenol PRN OTC.    Requesting a referral to Dermatology.      Patient Care Team:  AUSTYN Davidson-CNS as PCP - General (Internal Medicine)  Kolton Blanchard MD as Consulting Physician (Cardiology)  Shawn Capps MD as Referring Physician (Orthopaedic Surgery)  José Antonio Wright MD as Consulting Physician (Orthopaedic Surgery)  AUSTYN Angela-CNP as Nurse Practitioner (Cardiology)     Review of Systems   Constitutional:  Negative for activity change, appetite change, chills, fatigue, fever and unexpected weight change.   HENT:  Negative for ear pain, hearing loss, nosebleeds, sore throat, tinnitus and trouble swallowing.    Eyes:  Negative for photophobia, pain and visual disturbance.   Respiratory:  Negative for cough, chest tightness, shortness of breath and wheezing.    Cardiovascular:  Negative for chest pain, palpitations and leg swelling.   Gastrointestinal:  Negative for abdominal pain, blood in stool, constipation, diarrhea, nausea and vomiting.   Endocrine: Negative for cold intolerance, heat intolerance, polydipsia and polyuria.   Genitourinary:  Negative for dysuria,  "flank pain and hematuria.   Musculoskeletal:  Negative for arthralgias, back pain, joint swelling, myalgias and neck pain.   Skin:  Negative for pallor, rash and wound.   Allergic/Immunologic: Negative for immunocompromised state.   Neurological:  Negative for dizziness, seizures and headaches.   Hematological:  Does not bruise/bleed easily.   Psychiatric/Behavioral:  Negative for confusion and sleep disturbance.        Objective   Vitals:  /64   Pulse 94   Ht 1.702 m (5' 7\")   Wt 79.7 kg (175 lb 9.6 oz)   SpO2 96%   BMI 27.50 kg/m²       Physical Exam  Vitals and nursing note reviewed.   Constitutional:       General: He is not in acute distress.     Appearance: Normal appearance.   HENT:      Head: Normocephalic.      Nose: Nose normal.   Eyes:      Conjunctiva/sclera: Conjunctivae normal.   Neck:      Vascular: No carotid bruit.   Cardiovascular:      Rate and Rhythm: Normal rate and regular rhythm.      Pulses: Normal pulses.      Heart sounds: Normal heart sounds.   Pulmonary:      Effort: Pulmonary effort is normal.      Breath sounds: Normal breath sounds.   Abdominal:      General: Bowel sounds are normal.      Palpations: Abdomen is soft.   Musculoskeletal:         General: Normal range of motion.      Cervical back: Normal range of motion.   Skin:     General: Skin is warm and dry.   Neurological:      Mental Status: He is alert and oriented to person, place, and time. Mental status is at baseline.   Psychiatric:         Mood and Affect: Mood normal.         Behavior: Behavior normal.       Assessment & Plan  Numbness and tingling in both hands    Orders:    Follow Up In [facility] - Medicare Annual    Follow Up In [facility] - PCP - Established; Future    Benign essential hypertension    Orders:    Follow Up In [facility] - PCP - Medicare Annual    Follow Up In [facility] Established; Future    Primary hypertension    Orders:    Follow " Up In [facility] - PCP - Medicare Annual    Follow Up In [facility]; Future    Medicare annual wellness visit, subsequent    Orders:    Follow Up In [facility] - PCP - Medicare Annual    Follow Up In [facility]; Future    Vitamin D deficiency    Orders:    Follow Up In [facility] - PCP - Medicare Annual    Follow Up In [facility]; Future    Prediabetes    Orders:    Follow Up In [facility] - PCP - Medicare Annual    Follow Up In [facility]; Future    Vitamin B12 deficiency    Orders:    Follow Up In [facility] - PCP - Medicare Annual    Follow Up In [facility]; Future    Aortic dilatation (CMS-HCC)    Orders:    Follow Up In [facility] - PCP - Medicare Annual    Follow Up In [facility]; Future    Dyslipidemia    Orders:    Follow Up In [facility] - PCP - Medicare Annual    Follow Up In [facility]; Future    Prostate cancer screening    Orders:    Prostate Specific Antigen, Screen; Future    Healthcare maintenance    Orders:    Referral to Dermatology             Reviewed most recent lab work completed with patient.      Prediabetes, Dyslipidemia: A1C 5.9%. Cholesterol improved on most recent lab work. Lifestyle changes recommended: Diet consisting of low fat foods, lean meats, high fiber, fresh fruits and vegetables. 150 min/ weekly aerobic exercise. Following with Cardiology, annually.   Hypertension: Continue Lisinopril, Amlodipine, and Chlorthalidone. Continue to follow with Cardiology.   Aortic Dilation: Monitoring with Cardiology. Stable. Continue to monitor.   Numbness/Tingling: Continue Gabapentin. Continue to monitor.   Vitamin D Deficiency: Vitamin D. Reevaluate with next lab work.   Vitamin B12 Deficiency: Vitamin  B12. Reevaluate with next lab work.   Right Shoulder Pain: Surgery with Orthopedics in October. Progressing with PT. Continue to follow up as previously determined.   Medicare Wellness: Routine and age appropriate recommendations discussed with the patient today and patient verbalized understanding of the recommendations.  Questions answered.  Age appropriate immunizations and preventative screenings discussed with the patient and ordered as appropriate. Labs updated and ordered as indicated. Recommend healthy diet and daily exercise to maintain healthy body weight. 5 minutes  were spent screening for depression using PHQ2/PHQ9 as documented in the chart.   Skin Evaluation: Dermatology referral as requested for Dr. Tse.      Medicare Wellness: December 2024.    Tdap: September 2017.   Prostate Screening: June 2024.   Cologuard: December 2022, negative.   Declined updated immunizations.

## 2024-12-24 ENCOUNTER — TELEPHONE (OUTPATIENT)
Dept: PRIMARY CARE | Facility: CLINIC | Age: 69
End: 2024-12-24
Payer: MEDICARE

## 2024-12-24 DIAGNOSIS — I10 BENIGN ESSENTIAL HYPERTENSION: ICD-10-CM

## 2024-12-24 RX ORDER — AMLODIPINE BESYLATE 2.5 MG/1
2.5 TABLET ORAL DAILY
Qty: 90 TABLET | Refills: 3 | Status: SHIPPED | OUTPATIENT
Start: 2024-12-24 | End: 2025-12-24

## 2025-01-20 ENCOUNTER — HOSPITAL ENCOUNTER (OUTPATIENT)
Dept: RADIOLOGY | Facility: CLINIC | Age: 70
Discharge: HOME | End: 2025-01-20
Payer: MEDICARE

## 2025-01-20 ENCOUNTER — OFFICE VISIT (OUTPATIENT)
Dept: ORTHOPEDIC SURGERY | Facility: CLINIC | Age: 70
End: 2025-01-20
Payer: MEDICARE

## 2025-01-20 ENCOUNTER — APPOINTMENT (OUTPATIENT)
Dept: ORTHOPEDIC SURGERY | Facility: CLINIC | Age: 70
End: 2025-01-20
Payer: MEDICARE

## 2025-01-20 VITALS — WEIGHT: 177.2 LBS | HEIGHT: 67 IN | BODY MASS INDEX: 27.81 KG/M2

## 2025-01-20 DIAGNOSIS — Z96.611 STATUS POST REVERSE ARTHROPLASTY OF RIGHT SHOULDER: ICD-10-CM

## 2025-01-20 PROCEDURE — 3008F BODY MASS INDEX DOCD: CPT

## 2025-01-20 PROCEDURE — 1123F ACP DISCUSS/DSCN MKR DOCD: CPT

## 2025-01-20 PROCEDURE — 73030 X-RAY EXAM OF SHOULDER: CPT | Mod: RIGHT SIDE | Performed by: RADIOLOGY

## 2025-01-20 PROCEDURE — 1159F MED LIST DOCD IN RCRD: CPT

## 2025-01-20 PROCEDURE — 73030 X-RAY EXAM OF SHOULDER: CPT | Mod: RT

## 2025-01-20 PROCEDURE — 99024 POSTOP FOLLOW-UP VISIT: CPT

## 2025-01-20 PROCEDURE — 1160F RVW MEDS BY RX/DR IN RCRD: CPT

## 2025-01-20 PROCEDURE — 1036F TOBACCO NON-USER: CPT

## 2025-01-20 NOTE — PROGRESS NOTES
"PRIMARY CARE PHYSICIAN: AUSTYN Davidson-[facility]    ORTHOPAEDIC POSTOPERATIVE VISIT    ASSESSMENT & PLAN    Impression: 69 y.o. male 3 months postop s/p Right reverse total shoulder arthroplasty, biceps tenodesis done 10/29/2024.    Plan:   Overall Alen continues to do excellent. He has obtained great range of motion and is progressing his activities without difficulty. He will continue to work on his strengthening through the post-operative protocol for the above. We reviewed his post-operative precautions and he expressed understanding. Alen will ice and rest the shoulder as he needs and follow-up with us as his annual post-op appointment.     I reviewed their postoperative timeline and plan with them. They understand the postoperative precautions and the treatment plan going forward.     Follow-Up: Patient will follow-up 9 months with x-rays right shoulder    At the end of the visit, all questions were answered in full. The patient is in agreement with the plan and recommendations. They will call the office with any questions/concerns.      Note dictated with [employer] software. Completed without full typed error editing and sent to avoid delay.      SUBJECTIVE  CHIEF COMPLAINT: Post-op    HPI: Alen Cobian is a 69 y.o. patient who is now 3 months postop s/p Right reverse total shoulder arthroplasty, biceps tenodesis done 10/29/2024. He states he is doing well and denies any pain or discomfort. He was doing Physical Therapy with his son, who is a physical therapist at [facility]. Feels he has developed good range of motion. Only some discomfort when he \"overdoes it.\" Xrays done today.       REVIEW OF SYSTEMS  Constitutional: See HPI for pain assessment, No significant weight loss, recent trauma  Cardiovascular: No chest pain, shortness of breath  Respiratory: No difficulty breathing, cough  Gastrointestinal: No nausea, vomiting, diarrhea, constipation  Musculoskeletal: Noted in HPI, " "positive for pain, restricted motion, stiffness  Integumentary: No rashes, easy bruising, redness   Neurological: no numbness or tingling in extremities, no gait disturbances   Psychiatric: No mood changes, memory changes, social issues  Heme/Lymph: no excessive swelling, easy bruising, excessive bleeding  ENT: No hearing changes  Eyes: No vision changes    CURRENT MEDICATIONS:   Current Outpatient Medications   Medication Sig Dispense Refill    acetaminophen (Tylenol) 500 mg tablet Take 1 tablet (500 mg) by mouth 2 times a day as needed for mild pain (1 - 3).      amLODIPine (Norvasc) 2.5 mg tablet Take 1 tablet (2.5 mg) by mouth once daily. 90 tablet 3    chlorthalidone (Hygroton) 25 mg tablet Take 1 tablet (25 mg) by mouth once daily. 90 tablet 3    gabapentin (Neurontin) 300 mg capsule Take 1 capsule (300 mg) by mouth 2 times a day. 180 capsule 3    lisinopril 20 mg tablet Take 1 tablet (20 mg) by mouth 2 times a day. as directed 180 tablet 3     No current facility-administered medications for this visit.        OBJECTIVE    PHYSICAL EXAM      10/29/2024     1:46 PM 10/29/2024     2:00 PM 10/29/2024     2:13 PM 10/29/2024     2:30 PM 11/11/2024     1:32 PM 12/9/2024    11:17 AM 12/17/2024    12:45 PM   Vitals   Systolic 122 133 144 140   118   Diastolic 73 79 88 80   64   Heart Rate 81 89 95 85   94   Temp 36 °C (96.8 °F)  36.3 °C (97.3 °F) 36.4 °C (97.5 °F)      Resp 14 16 18 16      Height     1.702 m (5' 7.01\") 1.702 m (5' 7\") 1.702 m (5' 7\")   Weight (lb)     174.16 175 175.6   BMI     27.27 kg/m2 27.41 kg/m2 27.5 kg/m2   BSA (m2)     1.93 m2 1.94 m2 1.94 m2   Visit Report     Report Report Report      There is no height or weight on file to calculate BMI.    General: Well-appearing, no acute distress    Skin intact bilateral upper and lower extremities  No erythema  No warmth    Detailed examination of the right shoulder demonstrates:  Surgical incision well-healed  No erythema or warmth  No " ecchymosis  Resolving swelling, minimal  Biceps contour normal  Shoulder range of motion: forward flexion 140, touching back of head  Upper extremity motor grossly intact  C5-T1 sensation intact bilaterally  2+ radial pulses bilaterally  Warm and well-perfused, brisk capillary refill    IMAGING:   X-ray views of the right shoulder taken today and reviewed by me demonstrate a reverse total shoulder arthroplasty in place without evidence of hardware complication, implant loosening or dislocation

## 2025-06-12 ENCOUNTER — TELEPHONE (OUTPATIENT)
Dept: PRIMARY CARE | Facility: CLINIC | Age: 70
End: 2025-06-12
Payer: MEDICARE

## 2025-06-12 DIAGNOSIS — I10 PRIMARY HYPERTENSION: ICD-10-CM

## 2025-06-12 DIAGNOSIS — R20.2 NUMBNESS AND TINGLING IN BOTH HANDS: ICD-10-CM

## 2025-06-12 DIAGNOSIS — R20.0 NUMBNESS AND TINGLING IN BOTH HANDS: ICD-10-CM

## 2025-06-12 DIAGNOSIS — I10 BENIGN ESSENTIAL HYPERTENSION: ICD-10-CM

## 2025-06-12 RX ORDER — LISINOPRIL 20 MG/1
20 TABLET ORAL 2 TIMES DAILY
Qty: 180 TABLET | Refills: 3 | Status: SHIPPED | OUTPATIENT
Start: 2025-06-12 | End: 2026-06-07

## 2025-06-12 RX ORDER — CHLORTHALIDONE 25 MG/1
25 TABLET ORAL DAILY
Qty: 90 TABLET | Refills: 3 | Status: SHIPPED | OUTPATIENT
Start: 2025-06-12 | End: 2026-06-07

## 2025-06-12 RX ORDER — AMLODIPINE BESYLATE 2.5 MG/1
2.5 TABLET ORAL DAILY
Qty: 90 TABLET | Refills: 3 | Status: SHIPPED | OUTPATIENT
Start: 2025-06-12 | End: 2026-06-12

## 2025-06-12 RX ORDER — GABAPENTIN 300 MG/1
300 CAPSULE ORAL 2 TIMES DAILY
Qty: 180 CAPSULE | Refills: 3 | Status: SHIPPED | OUTPATIENT
Start: 2025-06-12 | End: 2026-06-07

## 2025-06-12 NOTE — TELEPHONE ENCOUNTER
Med refill   gabapentin (Neurontin) 300 mg capsule [[id number]]     lisinopril 20 mg tablet [[id number]]     chlorthalidone (Hygroton) 25 mg tablet [[id number]]     amLODIPine (Norvasc) 2.5 mg tablet [[id number]]     [facility] [address] [place] [address] MATILDA DUNCAN  [facility] MATILDA DUNCANFormerly [facility] [address]  Phone: [phone]  Fax: [phone]  AUBREY #: [id number]

## 2025-06-17 ENCOUNTER — APPOINTMENT (OUTPATIENT)
Dept: PRIMARY CARE | Facility: CLINIC | Age: 70
End: 2025-06-17
Payer: MEDICARE

## 2025-06-20 ENCOUNTER — APPOINTMENT (OUTPATIENT)
Dept: PRIMARY CARE | Facility: CLINIC | Age: 70
End: 2025-06-20
Payer: MEDICARE

## 2025-06-20 VITALS
WEIGHT: 173 LBS | BODY MASS INDEX: 27.15 KG/M2 | HEART RATE: 100 BPM | SYSTOLIC BLOOD PRESSURE: 110 MMHG | DIASTOLIC BLOOD PRESSURE: 70 MMHG | HEIGHT: 67 IN

## 2025-06-20 DIAGNOSIS — I77.819 AORTIC DILATATION: ICD-10-CM

## 2025-06-20 DIAGNOSIS — R20.2 NUMBNESS AND TINGLING IN BOTH HANDS: ICD-10-CM

## 2025-06-20 DIAGNOSIS — E55.9 VITAMIN D DEFICIENCY: ICD-10-CM

## 2025-06-20 DIAGNOSIS — R20.0 NUMBNESS AND TINGLING IN BOTH HANDS: ICD-10-CM

## 2025-06-20 DIAGNOSIS — E53.8 VITAMIN B12 DEFICIENCY: ICD-10-CM

## 2025-06-20 DIAGNOSIS — Z00.00 MEDICARE ANNUAL WELLNESS VISIT, SUBSEQUENT: ICD-10-CM

## 2025-06-20 DIAGNOSIS — I10 PRIMARY HYPERTENSION: ICD-10-CM

## 2025-06-20 DIAGNOSIS — I10 BENIGN ESSENTIAL HYPERTENSION: ICD-10-CM

## 2025-06-20 DIAGNOSIS — E78.5 DYSLIPIDEMIA: ICD-10-CM

## 2025-06-20 DIAGNOSIS — R73.03 PREDIABETES: ICD-10-CM

## 2025-06-20 PROCEDURE — 1124F ACP DISCUSS-NO DSCNMKR DOCD: CPT | Performed by: CLINICAL NURSE SPECIALIST

## 2025-06-20 PROCEDURE — 1160F RVW MEDS BY RX/DR IN RCRD: CPT | Performed by: CLINICAL NURSE SPECIALIST

## 2025-06-20 PROCEDURE — 1036F TOBACCO NON-USER: CPT | Performed by: CLINICAL NURSE SPECIALIST

## 2025-06-20 PROCEDURE — 1170F FXNL STATUS ASSESSED: CPT | Performed by: CLINICAL NURSE SPECIALIST

## 2025-06-20 PROCEDURE — G0444 DEPRESSION SCREEN ANNUAL: HCPCS | Performed by: CLINICAL NURSE SPECIALIST

## 2025-06-20 PROCEDURE — 3074F SYST BP LT 130 MM HG: CPT | Performed by: CLINICAL NURSE SPECIALIST

## 2025-06-20 PROCEDURE — G0439 PPPS, SUBSEQ VISIT: HCPCS | Performed by: CLINICAL NURSE SPECIALIST

## 2025-06-20 PROCEDURE — 99214 OFFICE O/P EST MOD 30 MIN: CPT | Performed by: CLINICAL NURSE SPECIALIST

## 2025-06-20 PROCEDURE — 3078F DIAST BP <80 MM HG: CPT | Performed by: CLINICAL NURSE SPECIALIST

## 2025-06-20 PROCEDURE — 1159F MED LIST DOCD IN RCRD: CPT | Performed by: CLINICAL NURSE SPECIALIST

## 2025-06-20 PROCEDURE — 3008F BODY MASS INDEX DOCD: CPT | Performed by: CLINICAL NURSE SPECIALIST

## 2025-06-20 ASSESSMENT — ENCOUNTER SYMPTOMS
FEVER: 0
WHEEZING: 0
POLYDIPSIA: 0
PHOTOPHOBIA: 0
DIZZINESS: 0
TROUBLE SWALLOWING: 0
SORE THROAT: 0
ABDOMINAL PAIN: 0
MYALGIAS: 0
FATIGUE: 0
NECK PAIN: 0
DIARRHEA: 0
ARTHRALGIAS: 0
APPETITE CHANGE: 0
JOINT SWELLING: 0
SLEEP DISTURBANCE: 0
LOSS OF SENSATION IN FEET: 0
SHORTNESS OF BREATH: 0
CHEST TIGHTNESS: 0
FLANK PAIN: 0
BLOOD IN STOOL: 0
HEADACHES: 0
COUGH: 0
BACK PAIN: 0
SEIZURES: 0
DYSURIA: 0
DEPRESSION: 0
CONFUSION: 0
CHILLS: 0
PALPITATIONS: 0
VOMITING: 0
EYE PAIN: 0
CONSTIPATION: 0
WOUND: 0
OCCASIONAL FEELINGS OF UNSTEADINESS: 0
UNEXPECTED WEIGHT CHANGE: 0
ACTIVITY CHANGE: 0
HEMATURIA: 0
BRUISES/BLEEDS EASILY: 0
NAUSEA: 0

## 2025-06-20 ASSESSMENT — PATIENT HEALTH QUESTIONNAIRE - PHQ9
1. LITTLE INTEREST OR PLEASURE IN DOING THINGS: NOT AT ALL
2. FEELING DOWN, DEPRESSED OR HOPELESS: NOT AT ALL
SUM OF ALL RESPONSES TO PHQ9 QUESTIONS 1 AND 2: 0

## 2025-06-20 ASSESSMENT — ACTIVITIES OF DAILY LIVING (ADL)
BATHING: INDEPENDENT
GROCERY_SHOPPING: INDEPENDENT
DRESSING: INDEPENDENT
DOING_HOUSEWORK: INDEPENDENT
TAKING_MEDICATION: INDEPENDENT
MANAGING_FINANCES: INDEPENDENT

## 2025-06-20 ASSESSMENT — COLUMBIA-SUICIDE SEVERITY RATING SCALE - C-SSRS
1. IN THE PAST MONTH, HAVE YOU WISHED YOU WERE DEAD OR WISHED YOU COULD GO TO SLEEP AND NOT WAKE UP?: NO
6. HAVE YOU EVER DONE ANYTHING, STARTED TO DO ANYTHING, OR PREPARED TO DO ANYTHING TO END YOUR LIFE?: NO
2. HAVE YOU ACTUALLY HAD ANY THOUGHTS OF KILLING YOURSELF?: NO

## 2025-06-20 NOTE — PROGRESS NOTES
Subjective   Reason for Visit: Alen Cobian is an 69 y.o. male here for a Medicare Wellness visit.     Past Medical, Surgical, and Family History reviewed and updated in chart.    Reviewed all medications by prescribing practitioner or clinical pharmacist (such as prescriptions, OTCs, herbal therapies and supplements) and documented in the medical record.    HPI    Here today as a follow up appointment. Due for Medicare Wellness.      Following with Cardiology, annually. Aortic dilation. Monitoring ECHO every two years. Planning to have done Summer of 2025, scheduled with Cardiology later this month.      Has been taking Gabapentin, using for the past few years. Has been taking for Neuropathy. Carpal Tunnel Surgery August 2023.    Patient Care Team:  AUSTYN Davidson-CNS as PCP - General (Internal Medicine)  Kolton Blanchard MD as Consulting Physician (Cardiology)  Shawn Capps MD as Referring Physician (Orthopaedic Surgery)  José Antonio Wright MD as Consulting Physician (Orthopaedic Surgery)  Kathrin Ray, AUSTYN-CNP as Nurse Practitioner (Cardiology)     Review of Systems   Constitutional:  Negative for activity change, appetite change, chills, fatigue, fever and unexpected weight change.   HENT:  Negative for ear pain, hearing loss, nosebleeds, sore throat, tinnitus and trouble swallowing.    Eyes:  Negative for photophobia, pain and visual disturbance.   Respiratory:  Negative for cough, chest tightness, shortness of breath and wheezing.    Cardiovascular:  Negative for chest pain, palpitations and leg swelling.   Gastrointestinal:  Negative for abdominal pain, blood in stool, constipation, diarrhea, nausea and vomiting.   Endocrine: Negative for cold intolerance, heat intolerance, polydipsia and polyuria.   Genitourinary:  Negative for dysuria, flank pain and hematuria.   Musculoskeletal:  Negative for arthralgias, back pain, joint swelling, myalgias and neck pain.   Skin:  Negative for  "pallor, rash and wound.   Allergic/Immunologic: Negative for immunocompromised state.   Neurological:  Negative for dizziness, seizures and headaches.   Hematological:  Does not bruise/bleed easily.   Psychiatric/Behavioral:  Negative for confusion and sleep disturbance.        Objective   Vitals:  /70 (BP Location: Right arm)   Pulse 100   Ht 1.702 m (5' 7\")   Wt 78.5 kg (173 lb)   BMI 27.10 kg/m²       Physical Exam  Vitals and nursing note reviewed.   Constitutional:       General: He is not in acute distress.     Appearance: Normal appearance.   HENT:      Head: Normocephalic.      Nose: Nose normal.   Eyes:      Conjunctiva/sclera: Conjunctivae normal.   Neck:      Vascular: No carotid bruit.   Cardiovascular:      Rate and Rhythm: Normal rate and regular rhythm.      Pulses: Normal pulses.      Heart sounds: Normal heart sounds.   Pulmonary:      Effort: Pulmonary effort is normal.      Breath sounds: Normal breath sounds.   Abdominal:      General: Bowel sounds are normal.      Palpations: Abdomen is soft.   Musculoskeletal:         General: Normal range of motion.      Cervical back: Normal range of motion.   Skin:     General: Skin is warm and dry.   Neurological:      Mental Status: He is alert and oriented to person, place, and time. Mental status is at baseline.   Psychiatric:         Mood and Affect: Mood normal.         Behavior: Behavior normal.       Assessment & Plan  Numbness and tingling in both hands    Orders:    Follow Up In [facility] - PCP - Established    Follow Up In [facility] - Medicare Annual; Future    Benign essential hypertension    Orders:    Follow Up In [facility] - PCP - Established    Follow Up In [facility] - Medicare Annual; Future    Primary hypertension    Orders:    Follow Up In [facility] - PCP - Established    Follow Up In [facility] - Medicare Annual; Future    Medicare annual " wellness visit, subsequent    Orders:    Follow Up In [facility] - PCP - Established    Follow Up In [facility] - PCP - Medicare Annual; Future    Vitamin D deficiency    Orders:    Follow Up In [facility] - PCP - Established    Follow Up In [facility] - PCP - Medicare Annual; Future    Prediabetes    Orders:    Follow Up In [facility] - PCP - Established    Follow Up In [facility] - PCP - Medicare Annual; Future    Vitamin B12 deficiency    Orders:    Follow Up In [facility] - PCP - Established    Follow Up In [facility] - PCP - Medicare Annual; Future    Aortic dilatation    Orders:    Follow Up In [facility] - PCP - Established    Follow Up In [facility] - PCP - Medicare Annual; Future    Dyslipidemia    Orders:    Follow Up In [facility] - Established    Follow Up In [facility] - PCP - Medicare Annual; Future           Has an updated order for lab work to have completed.     Prediabetes, Dyslipidemia: A1C 5.9%. Cholesterol improved on most recent lab work. Lifestyle changes recommended: Diet consisting of low fat foods, lean meats, high fiber, fresh fruits and vegetables. 150 min/ weekly aerobic exercise. Following with Cardiology, annually.   Hypertension: Continue Lisinopril, Amlodipine, and Chlorthalidone. Continue to follow with Cardiology.   Aortic Dilation: Monitoring with Cardiology. Stable. Continue to monitor.   Numbness/Tingling: Continue Gabapentin. Continue to monitor.   Vitamin D Deficiency: Vitamin D. Reevaluate with next lab work.   Vitamin B12 Deficiency: Vitamin B12. Reevaluate with next lab work.   Right Shoulder Pain: Surgery with Orthopedics in October. Progressing with PT. Continue to follow up as previously determined.   Medicare Wellness: Routine and age appropriate recommendations discussed with the patient today and patient verbalized understanding of  the recommendations.  Questions answered.  Age appropriate immunizations and preventative screenings discussed with the patient and ordered as appropriate. Labs updated and ordered as indicated. Recommend healthy diet and daily exercise to maintain healthy body weight. 5 minutes  were spent screening for depression using PHQ2/PHQ9 as documented in the chart.   Skin Evaluation: Dermatology referral as requested for Dr. Tse.      Medicare Wellness: June 2025.   Tdap: September 2017.   Prostate Screening: June 2024. To be done with next lab work.   Cologuard: December 2022, negative. Plan to order for December 2025.   Declined updated immunizations.

## 2025-06-21 LAB — PSA SERPL-MCNC: 1.28 NG/ML

## 2025-06-23 ENCOUNTER — TELEPHONE (OUTPATIENT)
Dept: PRIMARY CARE | Facility: CLINIC | Age: 70
End: 2025-06-23
Payer: MEDICARE

## 2025-06-23 NOTE — TELEPHONE ENCOUNTER
----- Message from Minal Varghese sent at 6/22/2025  8:52 PM EDT -----  Please call patient with results of lab work. I am not sure why they didn't draw all of his lab work, should have been released. Still active, but they only cedrick the PSA. Remaining still say Future.   Needs to repeat having lab work drawn and will follow up with results. Thank you!  ----- Message -----  From: [employer] Results In  Sent: 6/21/2025   5:57 AM EDT  To: Minal Varghese, AUSTYN-CNS

## 2025-06-27 ENCOUNTER — OFFICE VISIT (OUTPATIENT)
Dept: CARDIOLOGY | Facility: HOSPITAL | Age: 70
End: 2025-06-27
Payer: MEDICARE

## 2025-06-27 VITALS
HEART RATE: 75 BPM | BODY MASS INDEX: 27.04 KG/M2 | DIASTOLIC BLOOD PRESSURE: 60 MMHG | WEIGHT: 172.62 LBS | OXYGEN SATURATION: 96 % | SYSTOLIC BLOOD PRESSURE: 106 MMHG

## 2025-06-27 DIAGNOSIS — R06.02 SHORTNESS OF BREATH: Primary | ICD-10-CM

## 2025-06-27 DIAGNOSIS — I10 BENIGN ESSENTIAL HYPERTENSION: ICD-10-CM

## 2025-06-27 LAB
ATRIAL RATE: 74 BPM
P AXIS: 50 DEGREES
P OFFSET: 201 MS
P ONSET: 144 MS
PR INTERVAL: 152 MS
Q ONSET: 220 MS
QRS COUNT: 12 BEATS
QRS DURATION: 92 MS
QT INTERVAL: 380 MS
QTC CALCULATION(BAZETT): 421 MS
QTC FREDERICIA: 407 MS
R AXIS: 73 DEGREES
T AXIS: 22 DEGREES
T OFFSET: 410 MS
VENTRICULAR RATE: 74 BPM

## 2025-06-27 PROCEDURE — G2211 COMPLEX E/M VISIT ADD ON: HCPCS | Performed by: NURSE PRACTITIONER

## 2025-06-27 PROCEDURE — 3074F SYST BP LT 130 MM HG: CPT | Performed by: NURSE PRACTITIONER

## 2025-06-27 PROCEDURE — 99212 OFFICE O/P EST SF 10 MIN: CPT

## 2025-06-27 PROCEDURE — 3078F DIAST BP <80 MM HG: CPT | Performed by: NURSE PRACTITIONER

## 2025-06-27 PROCEDURE — 93005 ELECTROCARDIOGRAM TRACING: CPT | Performed by: NURSE PRACTITIONER

## 2025-06-27 PROCEDURE — 1159F MED LIST DOCD IN RCRD: CPT | Performed by: NURSE PRACTITIONER

## 2025-06-27 PROCEDURE — 99214 OFFICE O/P EST MOD 30 MIN: CPT | Performed by: NURSE PRACTITIONER

## 2025-06-27 ASSESSMENT — ENCOUNTER SYMPTOMS
CARDIOVASCULAR NEGATIVE: 1
EYES NEGATIVE: 1
ENDOCRINE NEGATIVE: 1
HEMATOLOGIC/LYMPHATIC NEGATIVE: 1
CONSTITUTIONAL NEGATIVE: 1
MYALGIAS: 1
PSYCHIATRIC NEGATIVE: 1
RESPIRATORY NEGATIVE: 1
NEUROLOGICAL NEGATIVE: 1
GASTROINTESTINAL NEGATIVE: 1

## 2025-06-27 NOTE — PROGRESS NOTES
Referred by Dr. Ray for No chief complaint on file.     History Of Present Illness:    Alen Cobian is a very pleasant 69 year old gentleman with a history of HTN and aortic dilatation, he is here for a follow up visit. The patient is seen in collaboration with Dr. Blanchard. Denies chest pain, shortness of breath or heart palpitations.Continues to stay active. Complains of dizziness.     Review of Systems   Constitutional: Negative.   HENT: Negative.     Eyes: Negative.    Cardiovascular: Negative.    Respiratory: Negative.     Endocrine: Negative.    Hematologic/Lymphatic: Negative.    Skin: Negative.    Musculoskeletal:  Positive for myalgias.   Gastrointestinal: Negative.    Neurological: Negative.    Psychiatric/Behavioral: Negative.          Past Medical History:  He has a past medical history of Hypertension.    He has no past medical history of Anemia, Asthma, Awareness under anesthesia, Cerebral vascular accident (Multi), CHF (congestive heart failure), COPD (chronic obstructive pulmonary disease) (Multi), Coronary artery disease, Delayed emergence from general anesthesia, Disease of thyroid gland, GERD (gastroesophageal reflux disease), HL (hearing loss), Malignant hyperthermia, Myocardial infarction (Multi), Pneumonia, PONV (postoperative nausea and vomiting), Seizure disorder (Multi), Sleep apnea, Stroke (Multi), TIA (transient ischemic attack), Type 2 diabetes mellitus, or Vision loss.    Past Surgical History:  He has a past surgical history that includes Carpal tunnel release (Bilateral, 08/08/2023); Other surgical history; and Shoulder surgery (Right, 10/29/2024).      Social History:  He reports that he quit smoking about 55 years ago. His smoking use included cigarettes. He started smoking about 56 years ago. He has a 0.5 pack-year smoking history. He has never used smokeless tobacco. He reports that he does not currently use alcohol. He reports that he does not use drugs.    Family  History:  Family History   Problem Relation Name Age of Onset    Heart disease Mother      Heart disease Father          Allergies:  Patient has no known allergies.    Outpatient Medications:  Current Outpatient Medications   Medication Instructions    acetaminophen (TYLENOL) 500 mg, 2 times daily PRN    amLODIPine (NORVASC) 2.5 mg, oral, Daily    chlorthalidone (HYGROTON) 25 mg, oral, Daily    gabapentin (NEURONTIN) 300 mg, oral, 2 times daily    lisinopril 20 mg, oral, 2 times daily, as directed        Last Recorded Vitals:  Vitals:    06/27/25 1115   BP: 106/60   Pulse: 75   SpO2: 96%   Weight: 78.3 kg (172 lb 9.9 oz)         Physical Exam:  Physical Exam  Vitals reviewed.   HENT:      Head: Normocephalic.      Nose: Nose normal.   Eyes:      Pupils: Pupils are equal, round, and reactive to light.   Cardiovascular:      Rate and Rhythm: Normal rate and regular rhythm.   Pulmonary:      Effort: Pulmonary effort is normal.      Breath sounds: Normal breath sounds.   Abdominal:      General: Abdomen is flat.      Palpations: Abdomen is soft.   Musculoskeletal:         General: Normal range of motion.      Cervical back: Normal range of motion.   Skin:     General: Skin is warm and dry.   Neurological:      General: No focal deficit present.      Mental Status: He is alert and oriented to person, place, and time.   Psychiatric:         Mood and Affect: Mood normal.            Last Labs:  CBC -  Lab Results   Component Value Date    WBC 6.9 10/08/2024    HGB 14.6 10/08/2024    HCT 44.1 10/08/2024    MCV 85 10/08/2024     10/08/2024       CMP -  Lab Results   Component Value Date    CALCIUM 9.9 10/08/2024    PROT 7.0 10/08/2024    ALBUMIN 4.6 10/08/2024    AST 23 10/08/2024    ALT 26 10/08/2024    ALKPHOS 56 10/08/2024    BILITOT 0.6 10/08/2024       LIPID PANEL -   Lab Results   Component Value Date    CHOL 181 06/04/2024    TRIG 117 06/04/2024    HDL 45.3 06/04/2024    CHHDL 4.0 06/04/2024    LDLF 127 (H)  04/28/2023    VLDL 23 06/04/2024    NHDL 136 06/04/2024       RENAL FUNCTION PANEL -   Lab Results   Component Value Date    GLUCOSE 106 (H) 10/08/2024     10/08/2024    K 4.1 10/08/2024     10/08/2024    CO2 25 10/08/2024    ANIONGAP 14 10/08/2024    BUN 27 (H) 10/08/2024    CREATININE 0.87 10/08/2024    GFRMALE >90 04/28/2023    CALCIUM 9.9 10/08/2024    ALBUMIN 4.6 10/08/2024        Lab Results   Component Value Date    HGBA1C 5.9 (H) 10/08/2024       Last Cardiology Tests:  ECG:  EKG independently reviewed from today showed sinus rhythm heart rate 74 bpm   Echo:  Echocardiogram 6/30/2023  1. Left ventricular systolic function is normal with a 55-60% estimated ejection fraction.  2. There is concentric left ventricular hypertrophy.  3. There is mild dilatation of the aortic root (4.1cm).  4. There is trace mitral, tricuspid and pulmonic regurgitation.     Echocardiogram 10/9/2020   1. The left ventricular systolic function is normal with a 60-65% estimated  ejection fraction.  2. Spectral Doppler shows an impaired relaxation pattern of left ventricular  diastolic filling.  3. There is mild mitral and tricuspid regurgitation.  4. There is mild dilatation of the ascending aorta and aortic root (3.9-4.1cm).    Echo 10/6/2017:   1. The left ventricular systolic function is low normal with a 55% estimated  ejection fraction.   2. There is trace mitral and tricuspid regurgitation.   3. There is mild dilatation of the ascending aorta (4.1cm) and aortic root  (3.8cm).    Echo 8/9/2011:  There is normal left ventricular systolic function.  The estimated ejection fraction is 60-65%.   The right ventricular global systolic function is normal.  There is mild tricuspid regurgitation.  There is no pericardial effusion.  There is evidence of borderline pulmonary hypertension.      Ejection Fractions:  LVEF 55-60%  Cath:    Stress Test:  Stress test 10/6/2017:  1. Normal stress myocardial perfusion imaging.  2.  Well-maintained left ventricular function.  Cardiac Imaging:  CAC score 2011:  FINDINGS:  The score and distribution of calcium in the coronary arteries is as   follows:      LAD  32, 11 sq mm      Total  32     The visualized mid/lower ascending thoracic aorta measures 4.2 cm in   diameter. The heart is within normal limits for size. No pericardial   effusion is identified. Mild atherosclerotic calcifications are seen   in the descending thoracic aorta.      Mild scarring and/or dependent atelectasis is seen in the lower lobes   bilaterally. No focal infiltrate, pleural effusion or pneumothorax is   seen. No definite discrete pulmonary nodules are seen bilaterally.    Assessment/Plan   Extremely pleasant 69 year-old gentleman with hypertension and mild aortic dilatation, doing well from CV standpoint. No complaints and remains very active. Repeat echocardiogram shows a preserved LV function. He complains of dizziness. The Amlodipine will be stopped. Blood pressure and heart rate remains well controlled.      Plan  -Call with any questions  -Continue Lisinopril 20mg daily and Chlorthalidone 25 mg daily  -stop the Amlodipine   -Follow up in 1 year or sooner if needed.   -repeat echo in one year        AUSTYN Hoyt-CNP

## 2025-06-28 LAB
25(OH)D3+25(OH)D2 SERPL-MCNC: 30 NG/ML (ref 30–100)
ALBUMIN SERPL-MCNC: 4.5 G/DL (ref 3.6–5.1)
ALP SERPL-CCNC: 61 U/L (ref 35–144)
ALT SERPL-CCNC: 23 U/L (ref 9–46)
ANION GAP SERPL CALCULATED.4IONS-SCNC: 10 MMOL/L (CALC) (ref 7–17)
AST SERPL-CCNC: 17 U/L (ref 10–35)
BILIRUB SERPL-MCNC: 0.4 MG/DL (ref 0.2–1.2)
BUN SERPL-MCNC: 30 MG/DL (ref 7–25)
CALCIUM SERPL-MCNC: 9.5 MG/DL (ref 8.6–10.3)
CHLORIDE SERPL-SCNC: 101 MMOL/L (ref 98–110)
CHOLEST SERPL-MCNC: 198 MG/DL
CHOLEST/HDLC SERPL: 5.2 (CALC)
CO2 SERPL-SCNC: 28 MMOL/L (ref 20–32)
CREAT SERPL-MCNC: 1.02 MG/DL (ref 0.7–1.28)
EGFRCR SERPLBLD CKD-EPI 2021: 79 ML/MIN/1.73M2
ERYTHROCYTE [DISTWIDTH] IN BLOOD BY AUTOMATED COUNT: 13.7 % (ref 11–15)
EST. AVERAGE GLUCOSE BLD GHB EST-MCNC: 128 MG/DL
EST. AVERAGE GLUCOSE BLD GHB EST-SCNC: 7.1 MMOL/L
GLUCOSE SERPL-MCNC: 91 MG/DL (ref 65–99)
HBA1C MFR BLD: 6.1 %
HCT VFR BLD AUTO: 46.1 % (ref 38.5–50)
HCV AB SERPL QL IA: NORMAL
HDLC SERPL-MCNC: 38 MG/DL
HGB BLD-MCNC: 14.8 G/DL (ref 13.2–17.1)
LDLC SERPL CALC-MCNC: 129 MG/DL (CALC)
MCH RBC QN AUTO: 28.5 PG (ref 27–33)
MCHC RBC AUTO-ENTMCNC: 32.1 G/DL (ref 32–36)
MCV RBC AUTO: 88.7 FL (ref 80–100)
NONHDLC SERPL-MCNC: 160 MG/DL (CALC)
PLATELET # BLD AUTO: 449 THOUSAND/UL (ref 140–400)
PMV BLD REES-ECKER: 8.7 FL (ref 7.5–12.5)
POTASSIUM SERPL-SCNC: 4.4 MMOL/L (ref 3.5–5.3)
PROT SERPL-MCNC: 6.8 G/DL (ref 6.1–8.1)
RBC # BLD AUTO: 5.2 MILLION/UL (ref 4.2–5.8)
SODIUM SERPL-SCNC: 139 MMOL/L (ref 135–146)
TRIGL SERPL-MCNC: 179 MG/DL
TSH SERPL-ACNC: 2.09 MIU/L (ref 0.4–4.5)
VIT B12 SERPL-MCNC: 215 PG/ML (ref 200–1100)
WBC # BLD AUTO: 6.8 THOUSAND/UL (ref 3.8–10.8)

## 2025-06-30 ENCOUNTER — TELEPHONE (OUTPATIENT)
Dept: ORTHOPEDIC SURGERY | Facility: CLINIC | Age: 70
End: 2025-06-30
Payer: MEDICARE

## 2025-06-30 ENCOUNTER — TELEPHONE (OUTPATIENT)
Dept: PRIMARY CARE | Facility: CLINIC | Age: 70
End: 2025-06-30
Payer: MEDICARE

## 2025-06-30 DIAGNOSIS — Z96.611 STATUS POST REVERSE ARTHROPLASTY OF RIGHT SHOULDER: Primary | ICD-10-CM

## 2025-06-30 DIAGNOSIS — E78.5 DYSLIPIDEMIA: ICD-10-CM

## 2025-06-30 DIAGNOSIS — R73.03 PREDIABETES: ICD-10-CM

## 2025-06-30 DIAGNOSIS — Z00.00 HEALTHCARE MAINTENANCE: ICD-10-CM

## 2025-06-30 RX ORDER — AMOXICILLIN 500 MG/1
2000 CAPSULE ORAL ONCE
Qty: 4 CAPSULE | Refills: 1 | Status: SHIPPED | OUTPATIENT
Start: 2025-06-30 | End: 2025-06-30

## 2025-06-30 RX ORDER — ATORVASTATIN CALCIUM 20 MG/1
20 TABLET, FILM COATED ORAL DAILY
Qty: 100 TABLET | Refills: 3 | Status: SHIPPED | OUTPATIENT
Start: 2025-06-30 | End: 2026-08-04

## 2025-06-30 NOTE — TELEPHONE ENCOUNTER
----- Message from Minal Varghese sent at 6/29/2025  5:36 PM EDT -----  Please call patient with lab results. LDL is elevated, agreeable to prescription medication for management? A1C 6.1%, still indicates Prediabetes, but worse. Focus on healthy dietary changes and   increasing physical activity. Have him repeat lab work prior to follow up appointment. Thank you!   ----- Message -----  From: Deja "[employer] Results In  Sent: 6/28/2025   5:18 AM EDT  To: Minal Varghese, AUSTYN-CNS

## 2025-06-30 NOTE — TELEPHONE ENCOUNTER
Alen Cobian called in requesting antibiotics for dental appointment. Please send to [facility] in [place]     s/p Right reverse total shoulder arthroplasty, biceps tenodesis done 10/29/2024.

## 2025-08-04 ENCOUNTER — TELEPHONE (OUTPATIENT)
Dept: PRIMARY CARE | Facility: CLINIC | Age: 70
End: 2025-08-04
Payer: MEDICARE

## 2025-08-04 NOTE — TELEPHONE ENCOUNTER
OK to schedule acute visit with myself or any provider with openings if he is still having symptoms.     Would like to obtain ER records as well to know what is done prior to appointment. Thank you!

## 2025-08-04 NOTE — TELEPHONE ENCOUNTER
Patient was in [place] working and he fell, hit his head went to the ER and got staples and had a full work up. Red alert trauma is what they called it. He now has 3 staples that need to come out in 7-10 days they need to come out? They vomiting has stopped but he gets really dizzy.His right leg is bothering him.  Should he be seen by his PCP before he needs his staples out?   Please advise  [facility] in [facility]

## 2025-08-05 NOTE — TELEPHONE ENCOUNTER
Called in today asking about tetanus also.  He is scheduled with Beny on Thursday 8/7 @ 8:40 am    He was seen at [facility] , [place], [place]  ER phone [phone]  Med Rec # [id number]

## 2025-08-07 ENCOUNTER — OFFICE VISIT (OUTPATIENT)
Dept: PRIMARY CARE | Facility: CLINIC | Age: 70
End: 2025-08-07
Payer: MEDICARE

## 2025-08-07 VITALS
HEIGHT: 67 IN | OXYGEN SATURATION: 96 % | HEART RATE: 76 BPM | WEIGHT: 170.8 LBS | BODY MASS INDEX: 26.81 KG/M2 | DIASTOLIC BLOOD PRESSURE: 80 MMHG | SYSTOLIC BLOOD PRESSURE: 122 MMHG

## 2025-08-07 DIAGNOSIS — Z91.81 HISTORY OF RECENT FALL: ICD-10-CM

## 2025-08-07 DIAGNOSIS — H81.12 BPPV (BENIGN PAROXYSMAL POSITIONAL VERTIGO), LEFT: ICD-10-CM

## 2025-08-07 DIAGNOSIS — Z09 HOSPITAL DISCHARGE FOLLOW-UP: Primary | ICD-10-CM

## 2025-08-07 DIAGNOSIS — S09.90XD TRAUMATIC INJURY OF HEAD, SUBSEQUENT ENCOUNTER: ICD-10-CM

## 2025-08-07 PROBLEM — R73.9 HYPERGLYCEMIA: Status: ACTIVE | Noted: 2023-04-21

## 2025-08-07 PROBLEM — G56.03 CARPAL TUNNEL SYNDROME, BILATERAL: Status: ACTIVE | Noted: 2023-07-28

## 2025-08-07 PROBLEM — I25.10 CAD (CORONARY ARTERY DISEASE): Status: ACTIVE | Noted: 2023-08-02

## 2025-08-07 PROCEDURE — 3079F DIAST BP 80-89 MM HG: CPT

## 2025-08-07 PROCEDURE — 99214 OFFICE O/P EST MOD 30 MIN: CPT

## 2025-08-07 PROCEDURE — 1159F MED LIST DOCD IN RCRD: CPT

## 2025-08-07 PROCEDURE — 3074F SYST BP LT 130 MM HG: CPT

## 2025-08-07 PROCEDURE — 1160F RVW MEDS BY RX/DR IN RCRD: CPT

## 2025-08-07 PROCEDURE — 3008F BODY MASS INDEX DOCD: CPT

## 2025-08-07 RX ORDER — MECLIZINE HYDROCHLORIDE 25 MG/1
25 TABLET ORAL 3 TIMES DAILY PRN
Qty: 30 TABLET | Refills: 1 | Status: SHIPPED | OUTPATIENT
Start: 2025-08-07

## 2025-08-07 ASSESSMENT — ENCOUNTER SYMPTOMS
SHORTNESS OF BREATH: 0
ARTHRALGIAS: 0
LOSS OF SENSATION IN FEET: 0
LIGHT-HEADEDNESS: 0
BACK PAIN: 0
DEPRESSION: 0
OCCASIONAL FEELINGS OF UNSTEADINESS: 0
DIZZINESS: 1
CHILLS: 0
FATIGUE: 0
HEADACHES: 0
FEVER: 0

## 2025-08-07 ASSESSMENT — ANXIETY QUESTIONNAIRES
1. FEELING NERVOUS, ANXIOUS, OR ON EDGE: NOT AT ALL
GAD7 TOTAL SCORE: 0
2. NOT BEING ABLE TO STOP OR CONTROL WORRYING: NOT AT ALL
5. BEING SO RESTLESS THAT IT IS HARD TO SIT STILL: NOT AT ALL
4. TROUBLE RELAXING: NOT AT ALL
IF YOU CHECKED OFF ANY PROBLEMS ON THIS QUESTIONNAIRE, HOW DIFFICULT HAVE THESE PROBLEMS MADE IT FOR YOU TO DO YOUR WORK, TAKE CARE OF THINGS AT HOME, OR GET ALONG WITH OTHER PEOPLE: NOT DIFFICULT AT ALL
6. BECOMING EASILY ANNOYED OR IRRITABLE: NOT AT ALL
7. FEELING AFRAID AS IF SOMETHING AWFUL MIGHT HAPPEN: NOT AT ALL
3. WORRYING TOO MUCH ABOUT DIFFERENT THINGS: NOT AT ALL

## 2025-08-07 ASSESSMENT — COLUMBIA-SUICIDE SEVERITY RATING SCALE - C-SSRS
6. HAVE YOU EVER DONE ANYTHING, STARTED TO DO ANYTHING, OR PREPARED TO DO ANYTHING TO END YOUR LIFE?: NO
2. HAVE YOU ACTUALLY HAD ANY THOUGHTS OF KILLING YOURSELF?: NO
1. IN THE PAST MONTH, HAVE YOU WISHED YOU WERE DEAD OR WISHED YOU COULD GO TO SLEEP AND NOT WAKE UP?: NO

## 2025-08-07 NOTE — PROGRESS NOTES
"Subjective   Patient ID: Alen Cobian is a 69 y.o. male who presents for Other (ED Follow Up from 8/2/25 wally in [place] ).    HPI   68 yo male presents for ED follow up. Was in [facility] this past Saturday (Aug 2nd) morning and while attempting to open the tailgate of his truck the pin broke off and he fell  backward in the ground and fainted and ended up at the emergency room at [facility]. Per report from ED,  patient had convulsion like activity almost immediately after the fall and it resolved. Patient obtained laceration to posterior scalp for which he got 3 staples. CT of head and cervical negative for fracture or intracranial abnormalities.     Patient reports after this incident he has been getting intermittently dizzy especially when lying down or getting up and also was getting nauseous & vomiting but this has resolved in the last few days and now he is able to keep the food down. Dizziness has improved some but still at times he does get dizzy. Dizziness does not last long. Denies syncope or fall at home.     Review of Systems   Constitutional:  Negative for chills, fatigue and fever.   Respiratory:  Negative for shortness of breath.    Cardiovascular:  Negative for chest pain.   Musculoskeletal:  Negative for arthralgias and back pain.   Neurological:  Positive for dizziness. Negative for syncope, light-headedness and headaches.   All other systems reviewed and are negative.      Objective   /80 (BP Location: Right arm, Patient Position: Sitting)   Pulse 76   Ht 1.702 m (5' 7\")   Wt 77.5 kg (170 lb 12.8 oz)   SpO2 96%   BMI 26.75 kg/m²     Physical Exam  Constitutional:       Appearance: Normal appearance.   HENT:      Head:      Comments: 3 staples to posterior scalp, no drainage, appear to be healing well but only day 5 so will remove them in few days    Eyes:      Conjunctiva/sclera: Conjunctivae normal.       Cardiovascular:      Rate and Rhythm: Normal rate and " regular rhythm.   Pulmonary:      Effort: Pulmonary effort is normal.      Breath sounds: Normal breath sounds.   Abdominal:      Palpations: Abdomen is soft.     Musculoskeletal:      Right lower leg: No edema.      Left lower leg: No edema.     Skin:     General: Skin is warm and dry.     Neurological:      General: No focal deficit present.      Mental Status: He is alert and oriented to person, place, and time.      Comments:  while doing Hallpike maneuver patient was  dizzy on the left side and positive for nystagmus    Psychiatric:         Mood and Affect: Mood normal.         Behavior: Behavior normal.         Assessment & Plan  Hospital discharge follow-up  -Reviewed hospital notes and tests  -Patient doing well besides having intermittent dizziness, no longer having nausea or vomiting       History of recent fall  -Denies fall after the recent incident, will do PT for vertigo       BPPV (benign paroxysmal positional vertigo), left  -Ordered PT for vestibular therapy  -Meclizine PRN  -Will follow up   -Discussed fall prevention & interventions   Orders:    Referral to Physical Therapy; Future    meclizine (Antivert) 25 mg tablet; Take 1 tablet (25 mg) by mouth 3 times a day as needed for dizziness.    Follow Up In [facility] - PCP - Established; Future    Traumatic injury of head, subsequent encounter  -Staples intact to posterior scalp, no drainage, healing appropriately, will remove in few days

## 2025-08-11 ENCOUNTER — APPOINTMENT (OUTPATIENT)
Dept: PRIMARY CARE | Facility: CLINIC | Age: 70
End: 2025-08-11
Payer: MEDICARE

## 2025-08-11 DIAGNOSIS — Z48.02 ENCOUNTER FOR STAPLE REMOVAL: ICD-10-CM

## 2025-10-20 ENCOUNTER — APPOINTMENT (OUTPATIENT)
Dept: ORTHOPEDIC SURGERY | Facility: CLINIC | Age: 70
End: 2025-10-20
Payer: MEDICARE

## 2025-11-07 ENCOUNTER — APPOINTMENT (OUTPATIENT)
Dept: PRIMARY CARE | Facility: CLINIC | Age: 70
End: 2025-11-07
Payer: MEDICARE

## 2026-06-22 ENCOUNTER — APPOINTMENT (OUTPATIENT)
Dept: PRIMARY CARE | Facility: CLINIC | Age: 71
End: 2026-06-22
Payer: MEDICARE

## (undated) DEVICE — Device

## (undated) DEVICE — DRAPE, INSTRUMENT, W/POUCH, STERI DRAPE, 7 X 11 IN, DISPOSABLE, STERILE

## (undated) DEVICE — ADHESIVE, DERMABOND, PRINEO, 12 X 9, STERILE

## (undated) DEVICE — GLOVE, SURGICAL, PROTEXIS PI , 8.0, PF, LF

## (undated) DEVICE — CLOSURE SYSTEM, DERMABOND, PRINEO, 22CM, STERILE

## (undated) DEVICE — BLANKET, LOWER BODY, VHA PLUS, ADULT

## (undated) DEVICE — DRILL BIT, 3.2MM PERFORM REVERSED PERIPHERAL

## (undated) DEVICE — MASK, FACE, TENET, FOAM POSITIONING, DISPOSABLE

## (undated) DEVICE — SUTURE, VICRYL, 2-0, 36 IN, CT-1, UNDYED

## (undated) DEVICE — IRRIGATION SYSTEM, WOUND, PULSAVAC PLUS

## (undated) DEVICE — GLOVE, PROTEXIS PI CLASSIC, SZ-6.5, PF, LF

## (undated) DEVICE — GLOVE, SURGICAL, PROTEXIS PI BLUE W/NEUTHERA, 6.5, PF, LF

## (undated) DEVICE — BLADE, SAW, SAGITTAL, 25.0 X 1.27 X 90MM

## (undated) DEVICE — TIP, SUCTION, YANKAUER, FLEXIBLE

## (undated) DEVICE — WAX, BONE, 2.5 GM

## (undated) DEVICE — GLOVE, SURGICAL, PROTEXIS PI , 7.5, PF, LF

## (undated) DEVICE — AEQUALIS PERFORM + 2.5 GUIDEWIRE ***DUPLICATE, PLEASE TRANSITION TO CAT # DWD017

## (undated) DEVICE — SUTURE, VICRYL, 0, 36 IN, CT-1, UNDYED

## (undated) DEVICE — SUTURE, MONOCRYL, 3-0, 27 IN, PS-2, UNDYED

## (undated) DEVICE — MEGADYNE ADULT ELECTRODE, DUAL PLATE, W/ 3M (10') PRE-ATTACHED CORD